# Patient Record
Sex: FEMALE | Race: BLACK OR AFRICAN AMERICAN | Employment: FULL TIME | URBAN - METROPOLITAN AREA
[De-identification: names, ages, dates, MRNs, and addresses within clinical notes are randomized per-mention and may not be internally consistent; named-entity substitution may affect disease eponyms.]

---

## 2017-02-07 ENCOUNTER — OFFICE VISIT (OUTPATIENT)
Dept: FAMILY MEDICINE CLINIC | Age: 57
End: 2017-02-07

## 2017-02-07 VITALS
WEIGHT: 157.5 LBS | BODY MASS INDEX: 26.89 KG/M2 | DIASTOLIC BLOOD PRESSURE: 56 MMHG | RESPIRATION RATE: 16 BRPM | HEART RATE: 68 BPM | SYSTOLIC BLOOD PRESSURE: 108 MMHG | HEIGHT: 64 IN

## 2017-02-07 DIAGNOSIS — E03.9 ACQUIRED HYPOTHYROIDISM: Primary | ICD-10-CM

## 2017-02-07 DIAGNOSIS — I10 ESSENTIAL HYPERTENSION: ICD-10-CM

## 2017-02-07 PROCEDURE — 99213 OFFICE O/P EST LOW 20 MIN: CPT | Performed by: FAMILY MEDICINE

## 2017-02-07 RX ORDER — VARENICLINE TARTRATE 25 MG
KIT ORAL
Qty: 1 EACH | Refills: 0 | Status: SHIPPED | OUTPATIENT
Start: 2017-02-07 | End: 2017-03-29

## 2017-02-07 RX ORDER — VARENICLINE TARTRATE 1 MG/1
TABLET, FILM COATED ORAL
Qty: 60 TABLET | Refills: 3 | Status: SHIPPED | OUTPATIENT
Start: 2017-02-07 | End: 2017-03-29

## 2017-02-07 ASSESSMENT — ENCOUNTER SYMPTOMS
CONSTIPATION: 0
SHORTNESS OF BREATH: 0
SINUS PRESSURE: 0

## 2017-03-10 RX ORDER — LEVOTHYROXINE SODIUM 100 MCG
TABLET ORAL
Qty: 15 TABLET | Refills: 0 | Status: SHIPPED | OUTPATIENT
Start: 2017-03-10 | End: 2017-03-28 | Stop reason: SDUPTHER

## 2017-03-28 LAB
A/G RATIO: 1.3 (ref 1.5–2.5)
ALBUMIN SERPL-MCNC: 4.1 GM/DL (ref 3.5–5)
ALP BLD-CCNC: 74 IU/L (ref 39–118)
ALT SERPL-CCNC: 15 IU/L (ref 10–40)
ANION GAP SERPL CALCULATED.3IONS-SCNC: 10 MMOL/L (ref 4–12)
AST SERPL-CCNC: 22 IU/L (ref 15–41)
BILIRUB SERPL-MCNC: 0.6 MG/DL (ref 0.2–1)
BUN BLDV-MCNC: 13 MG/DL (ref 7–20)
CALCIUM SERPL-MCNC: 8.9 MG/DL (ref 8.8–10.5)
CHLORIDE BLD-SCNC: 101 MEQ/L (ref 101–111)
CHOLESTEROL/HDL RELATIVE RISK: 2.2 (ref 4–4.4)
CHOLESTEROL: 171 MG/DL
CO2: 26 MEQ/L (ref 21–32)
CREAT SERPL-MCNC: 1.09 MG/DL (ref 0.6–1.3)
CREATININE CLEARANCE: >60
DIRECT-LDL / HDL RISK: 1.4
GLUCOSE: 88 MG/DL (ref 70–110)
HDLC SERPL-MCNC: 75 MG/DL
LDL CHOLESTEROL DIRECT: 108 MG/DL
POTASSIUM SERPL-SCNC: 3.6 MEQ/L (ref 3.6–5)
SODIUM BLD-SCNC: 137 MEQ/L (ref 135–145)
TOTAL PROTEIN: 7.2 G/DL (ref 6.2–8)
TRIGL SERPL-MCNC: 94 MG/DL
TSH SERPL DL<=0.05 MIU/L-ACNC: 3.85 MCIU/ML (ref 0.49–4.67)
VLDLC SERPL CALC-MCNC: 18 MG/DL

## 2017-03-28 RX ORDER — LEVOTHYROXINE SODIUM 100 MCG
TABLET ORAL
Qty: 30 TABLET | Refills: 11 | Status: SHIPPED | OUTPATIENT
Start: 2017-03-28 | End: 2018-04-05 | Stop reason: SDUPTHER

## 2017-03-29 ENCOUNTER — OFFICE VISIT (OUTPATIENT)
Dept: NEPHROLOGY | Age: 57
End: 2017-03-29

## 2017-03-29 VITALS
DIASTOLIC BLOOD PRESSURE: 82 MMHG | WEIGHT: 154 LBS | SYSTOLIC BLOOD PRESSURE: 118 MMHG | OXYGEN SATURATION: 97 % | HEART RATE: 61 BPM | BODY MASS INDEX: 26.43 KG/M2

## 2017-03-29 DIAGNOSIS — I10 ESSENTIAL HYPERTENSION: ICD-10-CM

## 2017-03-29 DIAGNOSIS — E87.6 HYPOKALEMIA: ICD-10-CM

## 2017-03-29 DIAGNOSIS — N18.2 CKD (CHRONIC KIDNEY DISEASE), STAGE II: Primary | ICD-10-CM

## 2017-03-29 PROCEDURE — 99213 OFFICE O/P EST LOW 20 MIN: CPT | Performed by: INTERNAL MEDICINE

## 2017-03-29 RX ORDER — POTASSIUM CHLORIDE 20 MEQ/1
20 TABLET, EXTENDED RELEASE ORAL DAILY
Qty: 30 TABLET | Refills: 6 | Status: SHIPPED | OUTPATIENT
Start: 2017-03-29 | End: 2018-04-25

## 2017-04-12 LAB
AMORPHOUS: PRESENT
APPEARANCE: ABNORMAL
BACTERIA: ABNORMAL
BILIRUBIN URINE: NEGATIVE
COLOR: YELLOW
GLUCOSE URINE: NEGATIVE
HYALINE CASTS: >50 /LPF
KETONES, URINE: NEGATIVE
LEUKOCYTES, UA: NEGATIVE
MUCUS: PRESENT
NITRITE, URINE: NEGATIVE
PH, URINE: 6 (ref 5–8)
PROTEIN, URINE: ABNORMAL
RBC URINE: ABNORMAL /HPF
SPECIFIC GRAVITY, URINE: 1.01 (ref 1–1.03)
SQUAMOUS EPITHELIAL: ABNORMAL /HPF
URINALYSIS REFLEX: NO
URINE HGB: NEGATIVE
UROBILINOGEN, URINE: ABNORMAL (ref 0.2–1)
WBC URINE: ABNORMAL /HPF

## 2017-04-17 ENCOUNTER — TELEPHONE (OUTPATIENT)
Dept: NEPHROLOGY | Age: 57
End: 2017-04-17

## 2017-04-17 DIAGNOSIS — E87.6 HYPOKALEMIA: Primary | ICD-10-CM

## 2017-05-17 DIAGNOSIS — I10 ESSENTIAL HYPERTENSION: Primary | ICD-10-CM

## 2017-05-17 DIAGNOSIS — N18.30 CKD (CHRONIC KIDNEY DISEASE), STAGE III (HCC): ICD-10-CM

## 2017-05-17 RX ORDER — ATENOLOL 100 MG/1
TABLET ORAL
Qty: 30 TABLET | Refills: 6 | Status: SHIPPED | OUTPATIENT
Start: 2017-05-17 | End: 2017-12-27 | Stop reason: SDUPTHER

## 2017-06-12 DIAGNOSIS — I10 ESSENTIAL HYPERTENSION: ICD-10-CM

## 2017-06-13 RX ORDER — NIFEDIPINE 90 MG/1
90 TABLET, FILM COATED, EXTENDED RELEASE ORAL DAILY
Qty: 30 TABLET | Refills: 6 | Status: SHIPPED | OUTPATIENT
Start: 2017-06-13 | End: 2017-12-20 | Stop reason: SDUPTHER

## 2017-08-09 ENCOUNTER — TELEPHONE (OUTPATIENT)
Dept: FAMILY MEDICINE CLINIC | Age: 57
End: 2017-08-09

## 2017-08-22 ENCOUNTER — OFFICE VISIT (OUTPATIENT)
Dept: FAMILY MEDICINE CLINIC | Age: 57
End: 2017-08-22

## 2017-08-22 VITALS
HEIGHT: 64 IN | DIASTOLIC BLOOD PRESSURE: 72 MMHG | RESPIRATION RATE: 16 BRPM | SYSTOLIC BLOOD PRESSURE: 154 MMHG | WEIGHT: 154.5 LBS | HEART RATE: 72 BPM | BODY MASS INDEX: 26.38 KG/M2

## 2017-08-22 DIAGNOSIS — I10 ESSENTIAL HYPERTENSION: Primary | ICD-10-CM

## 2017-08-22 PROCEDURE — 99213 OFFICE O/P EST LOW 20 MIN: CPT | Performed by: FAMILY MEDICINE

## 2017-08-22 ASSESSMENT — ENCOUNTER SYMPTOMS
CONSTIPATION: 0
SINUS PRESSURE: 0
SHORTNESS OF BREATH: 0

## 2017-12-20 DIAGNOSIS — I10 ESSENTIAL HYPERTENSION: ICD-10-CM

## 2017-12-20 RX ORDER — NIFEDIPINE 90 MG/1
TABLET, FILM COATED, EXTENDED RELEASE ORAL
Qty: 90 TABLET | Refills: 3 | Status: SHIPPED | OUTPATIENT
Start: 2017-12-20 | End: 2019-01-15 | Stop reason: SDUPTHER

## 2017-12-20 NOTE — TELEPHONE ENCOUNTER
Refill request per Rx via EPIC  Please approve or refuse Rx request:  Requested Prescriptions     Pending Prescriptions Disp Refills    NIFEdipine (ADALAT CC) 90 MG extended release tablet [Pharmacy Med Name: NIFEDIPINE ER 90 MG TABLET] 90 tablet 3     Sig: take 1 tablet by mouth once daily       Next appointment:  03/27/2018

## 2017-12-27 DIAGNOSIS — I10 ESSENTIAL HYPERTENSION: ICD-10-CM

## 2017-12-27 RX ORDER — ATENOLOL 100 MG/1
TABLET ORAL
Qty: 30 TABLET | Refills: 6 | Status: SHIPPED | OUTPATIENT
Start: 2017-12-27 | End: 2018-08-12 | Stop reason: SDUPTHER

## 2018-03-26 ENCOUNTER — TELEPHONE (OUTPATIENT)
Dept: NEPHROLOGY | Age: 58
End: 2018-03-26

## 2018-04-09 DIAGNOSIS — E03.9 ACQUIRED HYPOTHYROIDISM: Primary | ICD-10-CM

## 2018-04-09 RX ORDER — LEVOTHYROXINE SODIUM 100 MCG
TABLET ORAL
Qty: 30 TABLET | Refills: 0 | Status: SHIPPED | OUTPATIENT
Start: 2018-04-09 | End: 2018-05-02 | Stop reason: SDUPTHER

## 2018-04-10 LAB — TSH SERPL DL<=0.05 MIU/L-ACNC: 3.06 MCIU/ML (ref 0.49–4.67)

## 2018-04-17 DIAGNOSIS — N18.2 CHRONIC KIDNEY DISEASE, STAGE II (MILD): Primary | ICD-10-CM

## 2018-04-18 LAB
ANION GAP SERPL CALCULATED.3IONS-SCNC: 11 MMOL/L (ref 4–12)
BUN BLDV-MCNC: 16 MG/DL (ref 7–20)
CALCIUM SERPL-MCNC: 9.9 MG/DL (ref 8.8–10.5)
CHLORIDE BLD-SCNC: 100 MEQ/L (ref 101–111)
CO2: 24 MEQ/L (ref 21–32)
CREAT SERPL-MCNC: 1.07 MG/DL (ref 0.6–1.3)
CREATININE CLEARANCE: >60
GLUCOSE: 122 MG/DL (ref 70–110)
POTASSIUM SERPL-SCNC: 3.4 MEQ/L (ref 3.6–5)
SODIUM BLD-SCNC: 135 MEQ/L (ref 135–145)

## 2018-04-19 ENCOUNTER — TELEPHONE (OUTPATIENT)
Dept: FAMILY MEDICINE CLINIC | Age: 58
End: 2018-04-19

## 2018-04-19 ENCOUNTER — TELEPHONE (OUTPATIENT)
Dept: NEPHROLOGY | Age: 58
End: 2018-04-19

## 2018-04-19 DIAGNOSIS — N18.2 CKD (CHRONIC KIDNEY DISEASE), STAGE II: Primary | ICD-10-CM

## 2018-04-25 RX ORDER — POTASSIUM CHLORIDE 20 MEQ/1
20 TABLET, EXTENDED RELEASE ORAL DAILY
Qty: 90 TABLET | Refills: 3 | Status: SHIPPED | OUTPATIENT
Start: 2018-04-25 | End: 2018-10-19 | Stop reason: SDUPTHER

## 2018-05-02 ENCOUNTER — OFFICE VISIT (OUTPATIENT)
Dept: FAMILY MEDICINE CLINIC | Age: 58
End: 2018-05-02

## 2018-05-02 VITALS
SYSTOLIC BLOOD PRESSURE: 116 MMHG | HEART RATE: 72 BPM | DIASTOLIC BLOOD PRESSURE: 60 MMHG | WEIGHT: 165.38 LBS | HEIGHT: 64 IN | BODY MASS INDEX: 28.24 KG/M2 | RESPIRATION RATE: 16 BRPM

## 2018-05-02 DIAGNOSIS — I10 ESSENTIAL HYPERTENSION: Primary | ICD-10-CM

## 2018-05-02 DIAGNOSIS — E03.9 ACQUIRED HYPOTHYROIDISM: ICD-10-CM

## 2018-05-02 PROCEDURE — 99214 OFFICE O/P EST MOD 30 MIN: CPT | Performed by: FAMILY MEDICINE

## 2018-05-02 RX ORDER — LEVOTHYROXINE SODIUM 100 MCG
TABLET ORAL
Qty: 90 TABLET | Refills: 3 | Status: SHIPPED | OUTPATIENT
Start: 2018-05-02 | End: 2019-05-04 | Stop reason: SDUPTHER

## 2018-05-02 RX ORDER — DICLOFENAC SODIUM 75 MG/1
75 TABLET, DELAYED RELEASE ORAL 2 TIMES DAILY
COMMUNITY
Start: 2018-02-13 | End: 2018-05-30

## 2018-05-02 RX ORDER — DIPHENHYDRAMINE HCL 25 MG
25 TABLET ORAL EVERY 6 HOURS PRN
COMMUNITY
End: 2021-09-10

## 2018-05-02 ASSESSMENT — PATIENT HEALTH QUESTIONNAIRE - PHQ9
2. FEELING DOWN, DEPRESSED OR HOPELESS: 0
SUM OF ALL RESPONSES TO PHQ QUESTIONS 1-9: 0
1. LITTLE INTEREST OR PLEASURE IN DOING THINGS: 0
SUM OF ALL RESPONSES TO PHQ9 QUESTIONS 1 & 2: 0

## 2018-05-02 ASSESSMENT — ENCOUNTER SYMPTOMS
CONSTIPATION: 0
SHORTNESS OF BREATH: 0
SINUS PRESSURE: 0

## 2018-05-22 LAB
ANION GAP SERPL CALCULATED.3IONS-SCNC: 9 MMOL/L (ref 4–12)
BUN BLDV-MCNC: 21 MG/DL (ref 7–20)
CALCIUM SERPL-MCNC: 8.8 MG/DL (ref 8.8–10.5)
CHLORIDE BLD-SCNC: 103 MEQ/L (ref 101–111)
CO2: 25 MEQ/L (ref 21–32)
CREAT SERPL-MCNC: 1.43 MG/DL (ref 0.6–1.3)
CREATININE CLEARANCE: 46
GLUCOSE: 117 MG/DL (ref 70–110)
POTASSIUM SERPL-SCNC: 3.6 MEQ/L (ref 3.6–5)
SODIUM BLD-SCNC: 137 MEQ/L (ref 135–145)

## 2018-05-30 ENCOUNTER — OFFICE VISIT (OUTPATIENT)
Dept: NEPHROLOGY | Age: 58
End: 2018-05-30
Payer: COMMERCIAL

## 2018-05-30 VITALS
OXYGEN SATURATION: 98 % | HEART RATE: 69 BPM | BODY MASS INDEX: 27.77 KG/M2 | SYSTOLIC BLOOD PRESSURE: 119 MMHG | WEIGHT: 161.8 LBS | DIASTOLIC BLOOD PRESSURE: 67 MMHG

## 2018-05-30 DIAGNOSIS — E87.6 HYPOKALEMIA: ICD-10-CM

## 2018-05-30 DIAGNOSIS — I10 ESSENTIAL HYPERTENSION: Primary | ICD-10-CM

## 2018-05-30 PROCEDURE — 99213 OFFICE O/P EST LOW 20 MIN: CPT | Performed by: INTERNAL MEDICINE

## 2018-05-30 RX ORDER — METHYLPREDNISOLONE 4 MG/1
4 TABLET ORAL PRN
Status: ON HOLD | COMMUNITY
Start: 2018-05-15 | End: 2020-06-18

## 2018-08-12 DIAGNOSIS — I10 ESSENTIAL HYPERTENSION: ICD-10-CM

## 2018-08-13 RX ORDER — ATENOLOL 100 MG/1
TABLET ORAL
Qty: 90 TABLET | Refills: 3 | Status: SHIPPED | OUTPATIENT
Start: 2018-08-13 | End: 2019-07-25 | Stop reason: SDUPTHER

## 2018-08-13 NOTE — TELEPHONE ENCOUNTER
Refill request from Rx via [employer]  Please approve or refuse Rx request:  Requested Prescriptions     Pending Prescriptions Disp Refills    atenolol (TENORMIN) 100 MG tablet [Pharmacy Med Name: ATENOLOL 100MG      TAB] 90 tablet 3     Sig: TAKE ONE TABLET BY MOUTH ONCE DAILY       Next appointment:  05.29.2019

## 2018-10-17 LAB
ANION GAP SERPL CALCULATED.3IONS-SCNC: 11 MMOL/L (ref 4–12)
BUN BLDV-MCNC: 13 MG/DL (ref 7–20)
CALCIUM SERPL-MCNC: 9.1 MG/DL (ref 8.8–10.5)
CHLORIDE BLD-SCNC: 99 MEQ/L (ref 101–111)
CO2: 28 MEQ/L (ref 21–32)
CREAT SERPL-MCNC: 0.93 MG/DL (ref 0.6–1.3)
CREATININE CLEARANCE: >60
GLUCOSE: 109 MG/DL (ref 70–110)
POTASSIUM SERPL-SCNC: 3.6 MEQ/L (ref 3.6–5)
SODIUM BLD-SCNC: 138 MEQ/L (ref 135–145)

## 2018-10-18 ENCOUNTER — TELEPHONE (OUTPATIENT)
Dept: NEPHROLOGY | Age: 58
End: 2018-10-18

## 2018-10-18 DIAGNOSIS — N18.2 CKD (CHRONIC KIDNEY DISEASE), STAGE II: ICD-10-CM

## 2018-10-18 NOTE — TELEPHONE ENCOUNTER
----- Message from Kimberly Moncada MD sent at 10/17/2018  9:43 PM EDT -----  Is she taking K-dur?   Pls confirm with patient

## 2018-10-19 RX ORDER — POTASSIUM CHLORIDE 20 MEQ/1
20 TABLET, EXTENDED RELEASE ORAL 2 TIMES DAILY
Qty: 180 TABLET | Refills: 3 | Status: SHIPPED | OUTPATIENT
Start: 2018-10-19 | End: 2019-05-28 | Stop reason: DRUGHIGH

## 2018-10-30 LAB — POTASSIUM SERPL-SCNC: 4.1 MEQ/L (ref 3.6–5)

## 2019-01-15 DIAGNOSIS — I10 ESSENTIAL HYPERTENSION: ICD-10-CM

## 2019-01-15 RX ORDER — NIFEDIPINE 90 MG/1
TABLET, FILM COATED, EXTENDED RELEASE ORAL
Qty: 90 TABLET | Refills: 3 | Status: SHIPPED | OUTPATIENT
Start: 2019-01-15 | End: 2020-02-04

## 2019-05-04 DIAGNOSIS — E03.9 ACQUIRED HYPOTHYROIDISM: ICD-10-CM

## 2019-05-06 RX ORDER — LEVOTHYROXINE SODIUM 100 MCG
TABLET ORAL
Qty: 15 TABLET | Refills: 0 | Status: SHIPPED | OUTPATIENT
Start: 2019-05-06 | End: 2019-05-21 | Stop reason: SDUPTHER

## 2019-05-06 NOTE — TELEPHONE ENCOUNTER
The pharmacy called requesting a refill of the below medication which has been pended for you:     Requested Prescriptions     Pending Prescriptions Disp Refills    SYNTHROID 100 MCG tablet [Pharmacy Med Name: SYNTHROID 100MCG TAB] 30 tablet 11     Sig: TAKE 1 TABLET BY MOUTH ONCE DAILY       Last Appointment Date: 5/2/2018  Next Appointment Date: Visit date not found    No Known Allergies

## 2019-05-08 LAB — TSH SERPL DL<=0.05 MIU/L-ACNC: 2.53 MCIU/ML (ref 0.49–4.67)

## 2019-05-09 ENCOUNTER — TELEPHONE (OUTPATIENT)
Dept: FAMILY MEDICINE CLINIC | Age: 59
End: 2019-05-09

## 2019-05-09 NOTE — TELEPHONE ENCOUNTER
----- Message from Antony Davidson MD sent at 5/8/2019  9:42 PM EDT -----  Let her know the TSH was fine and I sent a RF of the synthroid [place] [facility] [place]. Remind her to see me still this month.

## 2019-05-21 ENCOUNTER — OFFICE VISIT (OUTPATIENT)
Dept: FAMILY MEDICINE CLINIC | Age: 59
End: 2019-05-21

## 2019-05-21 VITALS
DIASTOLIC BLOOD PRESSURE: 70 MMHG | WEIGHT: 170.38 LBS | BODY MASS INDEX: 29.09 KG/M2 | HEIGHT: 64 IN | RESPIRATION RATE: 16 BRPM | HEART RATE: 72 BPM | SYSTOLIC BLOOD PRESSURE: 118 MMHG

## 2019-05-21 DIAGNOSIS — Z12.39 BREAST CANCER SCREENING: Primary | ICD-10-CM

## 2019-05-21 DIAGNOSIS — E03.9 ACQUIRED HYPOTHYROIDISM: ICD-10-CM

## 2019-05-21 DIAGNOSIS — Z11.59 NEED FOR HEPATITIS C SCREENING TEST: ICD-10-CM

## 2019-05-21 PROCEDURE — 99214 OFFICE O/P EST MOD 30 MIN: CPT | Performed by: FAMILY MEDICINE

## 2019-05-21 RX ORDER — LEVOTHYROXINE SODIUM 100 MCG
TABLET ORAL
Qty: 30 TABLET | Refills: 11 | Status: SHIPPED | OUTPATIENT
Start: 2019-05-21 | End: 2019-11-12 | Stop reason: SDUPTHER

## 2019-05-21 ASSESSMENT — PATIENT HEALTH QUESTIONNAIRE - PHQ9
SUM OF ALL RESPONSES TO PHQ QUESTIONS 1-9: 0
SUM OF ALL RESPONSES TO PHQ QUESTIONS 1-9: 0
SUM OF ALL RESPONSES TO PHQ9 QUESTIONS 1 & 2: 0
1. LITTLE INTEREST OR PLEASURE IN DOING THINGS: 0
2. FEELING DOWN, DEPRESSED OR HOPELESS: 0

## 2019-05-21 ASSESSMENT — ENCOUNTER SYMPTOMS
CONSTIPATION: 0
SHORTNESS OF BREATH: 0
SINUS PRESSURE: 0

## 2019-05-21 NOTE — PROGRESS NOTES
Subjective:      Patient ID: Clifton Warren is a 59 y.o. female. HPI  1. There has been a tingle in the left knee the past year. It is not getting better. It is not constant but 1-2 times a week for a few seconds. The buttock and back are fine then too. Sometimes on the right thigh too  Review of Systems   Constitutional: Negative for fatigue. HENT: Negative for sinus pressure. Eyes: Negative for visual disturbance. Respiratory: Negative for shortness of breath. Cardiovascular: Negative for chest pain. Gastrointestinal: Negative for constipation. Genitourinary: Negative. Musculoskeletal: Positive for arthralgias and myalgias. Skin: Negative for rash. Neurological: Negative for headaches. The patient's medications, allergies, past medical problems, surgical, social, and family histories were reviewed and updated as needed. Objective:   Physical Exam   Constitutional: She is oriented to person, place, and time. She appears well-developed and well-nourished. No distress. HENT:   Head: Normocephalic and atraumatic. Right Ear: External ear normal.   Left Ear: External ear normal.   Nose: Nose normal.   Mouth/Throat: Oropharynx is clear and moist. No oropharyngeal exudate. Eyes: Conjunctivae are normal. No scleral icterus. Neck: Neck supple. Carotid bruit is not present. No tracheal deviation present. No thyromegaly present. Cardiovascular: Normal rate, regular rhythm and normal heart sounds. Pulmonary/Chest: Effort normal and breath sounds normal.   Abdominal: Soft. Bowel sounds are normal. She exhibits no mass. Musculoskeletal: She exhibits no edema. There is some crepitance with the left knee but no swelling   Lymphadenopathy:     She has no cervical adenopathy. Neurological: She is alert and oriented to person, place, and time. Skin: Skin is warm and dry. Psychiatric: She has a normal mood and affect.  Her behavior is normal.     Blood pressure 118/70, pulse 72, resp. rate 16, height 5' 4\" (1.626 m), weight 170 lb 6 oz (77.3 kg), not currently breastfeeding. Assessment:       Diagnosis Orders   1. Breast cancer screening  WILBER DIGITAL SCREEN W OR WO CAD BILATERAL   2. Acquired hypothyroidism  SYNTHROID 100 MCG tablet   3. Need for hepatitis C screening test  Hepatitis C Antibody           Plan:      Get the mammogram  Do the non fasting lab soon   See me in a year.         Rk Marvin MD

## 2019-05-21 NOTE — PROGRESS NOTES
Pt scheduled at [facility] for WILBER Digital Screening on Friday May 24th at 130pm but pt is to arrive at 105pm. Pt informed of appt and prep.

## 2019-05-23 ENCOUNTER — TELEPHONE (OUTPATIENT)
Dept: NEPHROLOGY | Age: 59
End: 2019-05-23

## 2019-05-23 NOTE — TELEPHONE ENCOUNTER
----- Message from Sidney Pang MD sent at 5/22/2019  9:11 PM EDT -----  Is she taking K as prescribed?

## 2019-05-28 ENCOUNTER — OFFICE VISIT (OUTPATIENT)
Dept: NEPHROLOGY | Age: 59
End: 2019-05-28
Payer: COMMERCIAL

## 2019-05-28 VITALS
SYSTOLIC BLOOD PRESSURE: 107 MMHG | WEIGHT: 166.6 LBS | DIASTOLIC BLOOD PRESSURE: 62 MMHG | BODY MASS INDEX: 28.6 KG/M2 | OXYGEN SATURATION: 100 % | HEART RATE: 71 BPM

## 2019-05-28 DIAGNOSIS — E87.6 HYPOKALEMIA: Primary | ICD-10-CM

## 2019-05-28 DIAGNOSIS — I10 ESSENTIAL HYPERTENSION: ICD-10-CM

## 2019-05-28 PROCEDURE — 99213 OFFICE O/P EST LOW 20 MIN: CPT | Performed by: INTERNAL MEDICINE

## 2019-05-28 RX ORDER — POTASSIUM CHLORIDE 20 MEQ/1
20 TABLET, EXTENDED RELEASE ORAL 3 TIMES DAILY
Qty: 180 TABLET | Refills: 3 | Status: SHIPPED
Start: 2019-05-28 | End: 2019-10-22 | Stop reason: SDUPTHER

## 2019-05-28 NOTE — PROGRESS NOTES
[facility]    [facility], [address]   [place] [facility]  [phone]  Progress Note  5/28/2019 9:33 AM      Pt Name:    Tonie Haro  YOB: 1960  Primary Care Physician:  Lambert Mullins MD     Chief Complaint:   Chief Complaint   Patient presents with    Follow-up     essential hypertension        Background Information/Interval History:   The patient is a [place] old [address] female who was following Dr. Jordana Turner previously. She has hx hypertension for 15+ years, hx CKD II,  vitamin D deficiency and hypothyroidism.      She is here for annual follow-up. She is taking K-dur 20 meq twice a day. She has no complaints. No diarrhea. She is on atenolol and nifedipine. She was taking diclofenac for tendonitis which she is no longer taking it. She says BP at home is usually in 110 range. Past History:  Past Medical History:   Diagnosis Date    Chronic kidney disease     Hypertension 2001    Hyperthyroidism 1989    Vitamin D deficiency      Past Surgical History:   Procedure Laterality Date    FOOT SURGERY  11/18/2016    Dr Bee Estimable:  /62 (Site: Right Upper Arm, Position: Sitting, Cuff Size: Large Adult)   Pulse 71   Wt 166 lb 9.6 oz (75.6 kg)   SpO2 100%   BMI 28.60 kg/m²   Wt Readings from Last 3 Encounters:   05/28/19 166 lb 9.6 oz (75.6 kg)   05/21/19 170 lb 6 oz (77.3 kg)   05/30/18 161 lb 12.8 oz (73.4 kg)     Body mass index is 28.6 kg/m².      General Appearance: alert and cooperative with exam, appears comfortable, no distress  Oral: moist oral mucus membranes  Neck: No jugular venous distention  Lungs: Air entry B/L, no crackles or rales, no use of accessory muscles  Heart: S1, S2 heard  GI: soft, non-tender, no guarding  Extremities: No sig LE edema     Medications:  Current Outpatient Medications   Medication Sig Dispense Refill    SYNTHROID 100 MCG tablet TAKE 1 TABLET BY MOUTH ONCE DAILY 30 tablet 11    NIFEdipine (ADALAT CC) 90 MG extended release tablet TAKE ONE TABLET BY MOUTH ONCE DAILY 90 tablet 3    potassium chloride (KLOR-CON M) 20 MEQ extended release tablet Take 1 tablet by mouth 2 times daily 180 tablet 3    atenolol (TENORMIN) 100 MG tablet TAKE ONE TABLET BY MOUTH ONCE DAILY 90 tablet 3    methylPREDNISolone (MEDROL DOSEPACK) 4 MG tablet Take 4 mg by mouth as needed       diphenhydrAMINE (BENADRYL) 25 MG tablet Take 25 mg by mouth every 6 hours as needed for Itching      GARLIC PO Take 1 tablet by mouth daily      vitamin D (CHOLECALCIFEROL) 1000 UNIT TABS tablet Take 1,000 Units by mouth daily. No current facility-administered medications for this visit. Laboratory & Diagnostics:  Radiology/US kidneys:April 2014: L 10.2 cm, R 8.4 cm, No LINDA  Aug 2015: BMP: K 3.5, Creat 1.1, Hb 14.3, UA: no blood, 100 mg/dL protein, UPCR 100 mg/g  Feb 2016: , K 3.7, Creat 0.81  March 2017: Creat 1.09, K 3.6     May 2018: K 3.6, Creat 1.4  May 2019: K 3.3, Creat 1.07     Impression/Plan:   1. Hypokalemia: check renin and aldosterone levels. But appears unlikely this is primary hyperaldosteronism. BP seems very well controlled with two medications. I advised to stop nifedipine and start either lisinopril or aldactone but she declined. She says,  \"I dont want to experiment with a new pill. \" Take K-dur 20 meq po TID for now. Check K level in 1 week. 2. Hypertension: Stable/controlled. Continue with atenolol and nifedipine. 3. Right atrophic kidney     Orders Placed This Encounter   Procedures    Potassium    Basic Metabolic Panel    Protein / creatinine ratio, urine     Return in about 4 months (around 9/28/2019).       Sintia Hu MD  [facility]

## 2019-07-25 DIAGNOSIS — I10 ESSENTIAL HYPERTENSION: ICD-10-CM

## 2019-07-26 RX ORDER — ATENOLOL 100 MG/1
TABLET ORAL
Qty: 90 TABLET | Refills: 3 | Status: SHIPPED | OUTPATIENT
Start: 2019-07-26 | End: 2019-12-11

## 2019-10-22 RX ORDER — POTASSIUM CHLORIDE 20 MEQ/1
20 TABLET, EXTENDED RELEASE ORAL 3 TIMES DAILY
Qty: 90 TABLET | Refills: 2 | Status: SHIPPED | OUTPATIENT
Start: 2019-10-22 | End: 2019-12-11 | Stop reason: SDUPTHER

## 2019-11-12 ENCOUNTER — TELEPHONE (OUTPATIENT)
Dept: FAMILY MEDICINE CLINIC | Age: 59
End: 2019-11-12

## 2019-11-12 DIAGNOSIS — E03.9 ACQUIRED HYPOTHYROIDISM: ICD-10-CM

## 2019-11-12 RX ORDER — LEVOTHYROXINE SODIUM 0.1 MG/1
TABLET ORAL
Qty: 30 TABLET | Refills: 11 | Status: SHIPPED | OUTPATIENT
Start: 2019-11-12 | End: 2020-11-30

## 2019-12-10 LAB
CREATININE, RANDOM URINE: 110.3 MG/DL
PROTEIN, URINE, RANDOM: 32.7 MG/DL
PROTEIN/CREAT RATIO: 0.3 G/1.73M2

## 2019-12-11 ENCOUNTER — OFFICE VISIT (OUTPATIENT)
Dept: NEPHROLOGY | Age: 59
End: 2019-12-11
Payer: COMMERCIAL

## 2019-12-11 VITALS
SYSTOLIC BLOOD PRESSURE: 96 MMHG | HEART RATE: 63 BPM | BODY MASS INDEX: 27.32 KG/M2 | WEIGHT: 164 LBS | DIASTOLIC BLOOD PRESSURE: 58 MMHG | OXYGEN SATURATION: 98 % | HEIGHT: 65 IN

## 2019-12-11 DIAGNOSIS — E87.6 HYPOKALEMIA: Primary | ICD-10-CM

## 2019-12-11 DIAGNOSIS — I10 ESSENTIAL HYPERTENSION: ICD-10-CM

## 2019-12-11 PROCEDURE — 99213 OFFICE O/P EST LOW 20 MIN: CPT | Performed by: INTERNAL MEDICINE

## 2019-12-11 RX ORDER — ATENOLOL 50 MG/1
50 TABLET ORAL DAILY
Qty: 30 TABLET | Refills: 3 | Status: SHIPPED | OUTPATIENT
Start: 2019-12-11 | End: 2020-09-28

## 2019-12-11 RX ORDER — POTASSIUM CHLORIDE 20 MEQ/1
20 TABLET, EXTENDED RELEASE ORAL 3 TIMES DAILY
Qty: 90 TABLET | Refills: 3 | Status: SHIPPED | OUTPATIENT
Start: 2019-12-11 | End: 2020-05-11

## 2019-12-11 RX ORDER — BIOTIN 1 MG
TABLET ORAL DAILY
COMMUNITY
End: 2021-10-20

## 2020-02-04 RX ORDER — NIFEDIPINE 90 MG/1
TABLET, FILM COATED, EXTENDED RELEASE ORAL
Qty: 90 TABLET | Refills: 0 | Status: SHIPPED | OUTPATIENT
Start: 2020-02-04 | End: 2020-05-11

## 2020-05-11 RX ORDER — NIFEDIPINE 90 MG/1
TABLET, FILM COATED, EXTENDED RELEASE ORAL
Qty: 90 TABLET | Refills: 0 | Status: SHIPPED | OUTPATIENT
Start: 2020-05-11 | End: 2020-08-20

## 2020-05-11 RX ORDER — POTASSIUM CHLORIDE 20 MEQ/1
TABLET, EXTENDED RELEASE ORAL
Qty: 270 TABLET | Refills: 0 | Status: SHIPPED | OUTPATIENT
Start: 2020-05-11 | End: 2020-11-02

## 2020-05-12 ENCOUNTER — TELEPHONE (OUTPATIENT)
Dept: FAMILY MEDICINE CLINIC | Age: 60
End: 2020-05-12

## 2020-06-03 ENCOUNTER — HOSPITAL ENCOUNTER (OUTPATIENT)
Dept: GENERAL RADIOLOGY | Age: 60
Discharge: HOME | End: 2020-06-03
Payer: COMMERCIAL

## 2020-06-03 ENCOUNTER — HOSPITAL ENCOUNTER (OUTPATIENT)
Age: 60
Discharge: HOME | End: 2020-06-03
Payer: COMMERCIAL

## 2020-06-03 LAB
BASOPHILS # BLD: 0.4 %
BASOPHILS ABSOLUTE: 0 THOU/MM3 (ref 0–0.1)
EKG ATRIAL RATE: 67 BPM
EKG P AXIS: 64 DEGREES
EKG P-R INTERVAL: 160 MS
EKG Q-T INTERVAL: 434 MS
EKG QRS DURATION: 86 MS
EKG QTC CALCULATION (BAZETT): 458 MS
EKG R AXIS: 24 DEGREES
EKG T AXIS: 42 DEGREES
EKG VENTRICULAR RATE: 67 BPM
EOSINOPHIL # BLD: 1 %
EOSINOPHILS ABSOLUTE: 0.1 THOU/MM3 (ref 0–0.4)
ERYTHROCYTE [DISTWIDTH] IN BLOOD BY AUTOMATED COUNT: 15.2 % (ref 11.5–14.5)
ERYTHROCYTE [DISTWIDTH] IN BLOOD BY AUTOMATED COUNT: 50.3 FL (ref 35–45)
HCT VFR BLD CALC: 42.5 % (ref 37–47)
HEMOGLOBIN: 13.1 GM/DL (ref 12–16)
IMMATURE GRANS (ABS): 0.01 THOU/MM3 (ref 0–0.07)
IMMATURE GRANULOCYTES: 0.1 %
LYMPHOCYTES # BLD: 25.7 %
LYMPHOCYTES ABSOLUTE: 2 THOU/MM3 (ref 1–4.8)
MCH RBC QN AUTO: 27.9 PG (ref 26–33)
MCHC RBC AUTO-ENTMCNC: 30.8 GM/DL (ref 32.2–35.5)
MCV RBC AUTO: 90.4 FL (ref 81–99)
MONOCYTES # BLD: 9.8 %
MONOCYTES ABSOLUTE: 0.7 THOU/MM3 (ref 0.4–1.3)
NUCLEATED RED BLOOD CELLS: 0 /100 WBC
PLATELET # BLD: 232 THOU/MM3 (ref 130–400)
PMV BLD AUTO: 12.2 FL (ref 9.4–12.4)
POTASSIUM SERPL-SCNC: 4.2 MEQ/L (ref 3.5–5.2)
RBC # BLD: 4.7 MILL/MM3 (ref 4.2–5.4)
SEG NEUTROPHILS: 63 %
SEGMENTED NEUTROPHILS ABSOLUTE COUNT: 4.8 THOU/MM3 (ref 1.8–7.7)
WBC # BLD: 7.6 THOU/MM3 (ref 4.8–10.8)

## 2020-06-03 PROCEDURE — 85025 COMPLETE CBC W/AUTO DIFF WBC: CPT

## 2020-06-03 PROCEDURE — 93005 ELECTROCARDIOGRAM TRACING: CPT | Performed by: OBSTETRICS & GYNECOLOGY

## 2020-06-03 PROCEDURE — 71046 X-RAY EXAM CHEST 2 VIEWS: CPT

## 2020-06-03 PROCEDURE — 36415 COLL VENOUS BLD VENIPUNCTURE: CPT

## 2020-06-03 PROCEDURE — 84132 ASSAY OF SERUM POTASSIUM: CPT

## 2020-06-10 ENCOUNTER — OFFICE VISIT (OUTPATIENT)
Dept: FAMILY MEDICINE CLINIC | Age: 60
End: 2020-06-10

## 2020-06-10 VITALS
BODY MASS INDEX: 26.55 KG/M2 | HEART RATE: 68 BPM | SYSTOLIC BLOOD PRESSURE: 128 MMHG | WEIGHT: 159.38 LBS | HEIGHT: 65 IN | DIASTOLIC BLOOD PRESSURE: 72 MMHG | RESPIRATION RATE: 12 BRPM | TEMPERATURE: 97.2 F

## 2020-06-10 PROCEDURE — 99214 OFFICE O/P EST MOD 30 MIN: CPT | Performed by: FAMILY MEDICINE

## 2020-06-10 ASSESSMENT — ENCOUNTER SYMPTOMS
CONSTIPATION: 0
SINUS PRESSURE: 0
SHORTNESS OF BREATH: 0

## 2020-06-12 NOTE — PROGRESS NOTES
PAT call attempted, patient unavailable, left message to please call us back at your earliest convenience; [phone]

## 2020-06-13 ENCOUNTER — HOSPITAL ENCOUNTER (OUTPATIENT)
Age: 60
Discharge: HOME | End: 2020-06-13
Payer: COMMERCIAL

## 2020-06-13 PROCEDURE — U0002 COVID-19 LAB TEST NON-CDC: HCPCS

## 2020-06-14 LAB
PERFORMING LAB: NORMAL
REPORT: NORMAL
SARS-COV-2: NOT DETECTED

## 2020-06-18 ENCOUNTER — ANESTHESIA EVENT (OUTPATIENT)
Dept: OPERATING ROOM | Age: 60
End: 2020-06-18
Payer: COMMERCIAL

## 2020-06-18 ENCOUNTER — ANESTHESIA (OUTPATIENT)
Dept: OPERATING ROOM | Age: 60
End: 2020-06-18
Payer: COMMERCIAL

## 2020-06-18 ENCOUNTER — HOSPITAL ENCOUNTER (OUTPATIENT)
Age: 60
Setting detail: OUTPATIENT SURGERY
Discharge: HOME | End: 2020-06-18
Attending: OBSTETRICS & GYNECOLOGY | Admitting: OBSTETRICS & GYNECOLOGY
Payer: COMMERCIAL

## 2020-06-18 VITALS
DIASTOLIC BLOOD PRESSURE: 80 MMHG | HEART RATE: 70 BPM | SYSTOLIC BLOOD PRESSURE: 144 MMHG | OXYGEN SATURATION: 95 % | TEMPERATURE: 98 F | HEIGHT: 64 IN | WEIGHT: 155 LBS | BODY MASS INDEX: 26.46 KG/M2 | RESPIRATION RATE: 15 BRPM

## 2020-06-18 VITALS
OXYGEN SATURATION: 100 % | SYSTOLIC BLOOD PRESSURE: 126 MMHG | DIASTOLIC BLOOD PRESSURE: 62 MMHG | RESPIRATION RATE: 11 BRPM

## 2020-06-18 PROBLEM — Z98.890 S/P D&C (STATUS POST DILATION AND CURETTAGE): Status: ACTIVE | Noted: 2020-06-18

## 2020-06-18 PROCEDURE — 2500000003 HC RX 250 WO HCPCS: Performed by: NURSE ANESTHETIST, CERTIFIED REGISTERED

## 2020-06-18 PROCEDURE — 7100000011 HC PHASE II RECOVERY - ADDTL 15 MIN: Performed by: OBSTETRICS & GYNECOLOGY

## 2020-06-18 PROCEDURE — 88305 TISSUE EXAM BY PATHOLOGIST: CPT

## 2020-06-18 PROCEDURE — 3700000000 HC ANESTHESIA ATTENDED CARE: Performed by: OBSTETRICS & GYNECOLOGY

## 2020-06-18 PROCEDURE — 3600000013 HC SURGERY LEVEL 3 ADDTL 15MIN: Performed by: OBSTETRICS & GYNECOLOGY

## 2020-06-18 PROCEDURE — 7100000000 HC PACU RECOVERY - FIRST 15 MIN: Performed by: OBSTETRICS & GYNECOLOGY

## 2020-06-18 PROCEDURE — 2580000003 HC RX 258: Performed by: OBSTETRICS & GYNECOLOGY

## 2020-06-18 PROCEDURE — 6360000002 HC RX W HCPCS: Performed by: NURSE ANESTHETIST, CERTIFIED REGISTERED

## 2020-06-18 PROCEDURE — 7100000001 HC PACU RECOVERY - ADDTL 15 MIN: Performed by: OBSTETRICS & GYNECOLOGY

## 2020-06-18 PROCEDURE — 2709999900 HC NON-CHARGEABLE SUPPLY: Performed by: OBSTETRICS & GYNECOLOGY

## 2020-06-18 PROCEDURE — 3700000001 HC ADD 15 MINUTES (ANESTHESIA): Performed by: OBSTETRICS & GYNECOLOGY

## 2020-06-18 PROCEDURE — 7100000010 HC PHASE II RECOVERY - FIRST 15 MIN: Performed by: OBSTETRICS & GYNECOLOGY

## 2020-06-18 PROCEDURE — 3600000003 HC SURGERY LEVEL 3 BASE: Performed by: OBSTETRICS & GYNECOLOGY

## 2020-06-18 RX ORDER — MEPERIDINE HYDROCHLORIDE 25 MG/ML
12.5 INJECTION INTRAMUSCULAR; INTRAVENOUS; SUBCUTANEOUS EVERY 5 MIN PRN
Status: DISCONTINUED | OUTPATIENT
Start: 2020-06-18 | End: 2020-06-18 | Stop reason: HOSPADM

## 2020-06-18 RX ORDER — HYDRALAZINE HYDROCHLORIDE 20 MG/ML
5 INJECTION INTRAMUSCULAR; INTRAVENOUS EVERY 10 MIN PRN
Status: DISCONTINUED | OUTPATIENT
Start: 2020-06-18 | End: 2020-06-18 | Stop reason: HOSPADM

## 2020-06-18 RX ORDER — SODIUM CHLORIDE 9 MG/ML
INJECTION, SOLUTION INTRAVENOUS CONTINUOUS
Status: DISCONTINUED | OUTPATIENT
Start: 2020-06-18 | End: 2020-06-18 | Stop reason: HOSPADM

## 2020-06-18 RX ORDER — OXYCODONE HYDROCHLORIDE AND ACETAMINOPHEN 5; 325 MG/1; MG/1
1 TABLET ORAL EVERY 4 HOURS PRN
Status: DISCONTINUED | OUTPATIENT
Start: 2020-06-18 | End: 2020-06-18 | Stop reason: HOSPADM

## 2020-06-18 RX ORDER — IBUPROFEN 800 MG/1
400 TABLET ORAL EVERY 6 HOURS PRN
Status: DISCONTINUED | OUTPATIENT
Start: 2020-06-18 | End: 2020-06-18 | Stop reason: HOSPADM

## 2020-06-18 RX ORDER — OXYCODONE HYDROCHLORIDE AND ACETAMINOPHEN 5; 325 MG/1; MG/1
2 TABLET ORAL EVERY 4 HOURS PRN
Status: DISCONTINUED | OUTPATIENT
Start: 2020-06-18 | End: 2020-06-18 | Stop reason: HOSPADM

## 2020-06-18 RX ORDER — SODIUM CHLORIDE 0.9 % (FLUSH) 0.9 %
10 SYRINGE (ML) INJECTION EVERY 12 HOURS SCHEDULED
Status: DISCONTINUED | OUTPATIENT
Start: 2020-06-18 | End: 2020-06-18 | Stop reason: HOSPADM

## 2020-06-18 RX ORDER — GLYCOPYRROLATE 1 MG/5 ML
SYRINGE (ML) INTRAVENOUS PRN
Status: DISCONTINUED | OUTPATIENT
Start: 2020-06-18 | End: 2020-06-18 | Stop reason: SDUPTHER

## 2020-06-18 RX ORDER — SODIUM CHLORIDE 0.9 % (FLUSH) 0.9 %
10 SYRINGE (ML) INJECTION PRN
Status: DISCONTINUED | OUTPATIENT
Start: 2020-06-18 | End: 2020-06-18 | Stop reason: HOSPADM

## 2020-06-18 RX ORDER — FENTANYL CITRATE 50 UG/ML
INJECTION, SOLUTION INTRAMUSCULAR; INTRAVENOUS PRN
Status: DISCONTINUED | OUTPATIENT
Start: 2020-06-18 | End: 2020-06-18 | Stop reason: SDUPTHER

## 2020-06-18 RX ORDER — HYDROCODONE BITARTRATE AND ACETAMINOPHEN 5; 325 MG/1; MG/1
1 TABLET ORAL EVERY 6 HOURS PRN
Qty: 10 TABLET | Refills: 0 | Status: SHIPPED | OUTPATIENT
Start: 2020-06-18 | End: 2020-06-21

## 2020-06-18 RX ORDER — FENTANYL CITRATE 50 UG/ML
50 INJECTION, SOLUTION INTRAMUSCULAR; INTRAVENOUS EVERY 5 MIN PRN
Status: DISCONTINUED | OUTPATIENT
Start: 2020-06-18 | End: 2020-06-18 | Stop reason: HOSPADM

## 2020-06-18 RX ORDER — ONDANSETRON 2 MG/ML
4 INJECTION INTRAMUSCULAR; INTRAVENOUS
Status: DISCONTINUED | OUTPATIENT
Start: 2020-06-18 | End: 2020-06-18 | Stop reason: HOSPADM

## 2020-06-18 RX ORDER — PROPOFOL 10 MG/ML
INJECTION, EMULSION INTRAVENOUS PRN
Status: DISCONTINUED | OUTPATIENT
Start: 2020-06-18 | End: 2020-06-18 | Stop reason: SDUPTHER

## 2020-06-18 RX ORDER — MORPHINE SULFATE 2 MG/ML
2 INJECTION, SOLUTION INTRAMUSCULAR; INTRAVENOUS EVERY 5 MIN PRN
Status: DISCONTINUED | OUTPATIENT
Start: 2020-06-18 | End: 2020-06-18 | Stop reason: HOSPADM

## 2020-06-18 RX ORDER — DIPHENHYDRAMINE HYDROCHLORIDE 50 MG/ML
12.5 INJECTION INTRAMUSCULAR; INTRAVENOUS
Status: DISCONTINUED | OUTPATIENT
Start: 2020-06-18 | End: 2020-06-18 | Stop reason: HOSPADM

## 2020-06-18 RX ORDER — LABETALOL 20 MG/4 ML (5 MG/ML) INTRAVENOUS SYRINGE
5 EVERY 5 MIN PRN
Status: DISCONTINUED | OUTPATIENT
Start: 2020-06-18 | End: 2020-06-18 | Stop reason: HOSPADM

## 2020-06-18 RX ORDER — ONDANSETRON 2 MG/ML
INJECTION INTRAMUSCULAR; INTRAVENOUS PRN
Status: DISCONTINUED | OUTPATIENT
Start: 2020-06-18 | End: 2020-06-18 | Stop reason: SDUPTHER

## 2020-06-18 RX ORDER — DEXAMETHASONE SODIUM PHOSPHATE 4 MG/ML
INJECTION, SOLUTION INTRA-ARTICULAR; INTRALESIONAL; INTRAMUSCULAR; INTRAVENOUS; SOFT TISSUE PRN
Status: DISCONTINUED | OUTPATIENT
Start: 2020-06-18 | End: 2020-06-18 | Stop reason: SDUPTHER

## 2020-06-18 RX ADMIN — Medication 0.2 MG: at 09:42

## 2020-06-18 RX ADMIN — PROPOFOL 50 MG: 10 INJECTION, EMULSION INTRAVENOUS at 09:52

## 2020-06-18 RX ADMIN — PROPOFOL 150 MG: 10 INJECTION, EMULSION INTRAVENOUS at 09:46

## 2020-06-18 RX ADMIN — SODIUM CHLORIDE: 9 INJECTION, SOLUTION INTRAVENOUS at 09:40

## 2020-06-18 RX ADMIN — FENTANYL CITRATE 50 MCG: 50 INJECTION, SOLUTION INTRAMUSCULAR; INTRAVENOUS at 09:43

## 2020-06-18 RX ADMIN — DEXAMETHASONE SODIUM PHOSPHATE 12 MG: 4 INJECTION, SOLUTION INTRAMUSCULAR; INTRAVENOUS at 09:57

## 2020-06-18 RX ADMIN — ONDANSETRON HYDROCHLORIDE 4 MG: 4 INJECTION, SOLUTION INTRAMUSCULAR; INTRAVENOUS at 09:58

## 2020-06-18 RX ADMIN — FENTANYL CITRATE 50 MCG: 50 INJECTION, SOLUTION INTRAMUSCULAR; INTRAVENOUS at 09:49

## 2020-06-18 ASSESSMENT — PULMONARY FUNCTION TESTS
PIF_VALUE: 11
PIF_VALUE: 0
PIF_VALUE: 5
PIF_VALUE: 24
PIF_VALUE: 11
PIF_VALUE: 4
PIF_VALUE: 0
PIF_VALUE: 10
PIF_VALUE: 12
PIF_VALUE: 9
PIF_VALUE: 3
PIF_VALUE: 1
PIF_VALUE: 9
PIF_VALUE: 8
PIF_VALUE: 0
PIF_VALUE: 3
PIF_VALUE: 24
PIF_VALUE: 12
PIF_VALUE: 9
PIF_VALUE: 0
PIF_VALUE: 0
PIF_VALUE: 1
PIF_VALUE: 0
PIF_VALUE: 13
PIF_VALUE: 17
PIF_VALUE: 0

## 2020-06-18 ASSESSMENT — PAIN - FUNCTIONAL ASSESSMENT: PAIN_FUNCTIONAL_ASSESSMENT: 0-10

## 2020-06-18 NOTE — ANESTHESIA PRE PROCEDURE
Department of Anesthesiology  Preprocedure Note       Name:  Jenn Gentile   Age:  62 y.o.  :  1960                                          MRN:  [id number]         Date:  2020      Surgeon: Beverly Perkins):  Chris Rajput MD    Procedure: Procedure(s):  DILATATION AND CURETTAGE HYSTEROSCOPY    Medications prior to admission:   Prior to Admission medications    Medication Sig Start Date End Date Taking? Authorizing Provider   NIFEdipine (ADALAT CC) 90 MG extended release tablet Take 1 tablet by mouth once daily 20  Yes Renny López MD   potassium chloride (KLOR-CON M) 20 MEQ extended release tablet TAKE 1 TABLET BY MOUTH THREE TIMES DAILY 20  Yes Renny López MD   Biotin 1000 MCG TABS Take by mouth daily   Yes Historical Provider, MD   atenolol (TENORMIN) 50 MG tablet Take 1 tablet by mouth daily 19  Yes Renny López MD   levothyroxine (SYNTHROID) 100 MCG tablet TAKE 1 TABLET BY MOUTH ONCE DAILY 19  Yes Philip Ramirez MD   diphenhydrAMINE (BENADRYL) 25 MG tablet Take 25 mg by mouth every 6 hours as needed for Itching   Yes Historical Provider, MD   GARLIC PO Take 1 tablet by mouth daily   Yes Historical Provider, MD   vitamin D (CHOLECALCIFEROL) 1000 UNIT TABS tablet Take 1,000 Units by mouth daily.      Yes Historical Provider, MD       Current medications:    Current Facility-Administered Medications   Medication Dose Route Frequency Provider Last Rate Last Dose    0.9 % sodium chloride infusion   Intravenous Continuous Chris Rajput MD        sodium chloride flush 0.9 % injection 10 mL  10 mL Intravenous 2 times per day Chris Rajput MD        sodium chloride flush 0.9 % injection 10 mL  10 mL Intravenous PRN Chris Rajput MD           Allergies:  No Known Allergies    Problem List:    Patient Active Problem List   Diagnosis Code    Stage 2 chronic kidney disease N18.2    Vitamin D deficiency E55.9    CKD (chronic kidney disease), stage III ([facility] [address] N18.3

## 2020-06-18 NOTE — ANESTHESIA POSTPROCEDURE EVALUATION
Department of Anesthesiology  Postprocedure Note    Patient: Linda Snowden  MRN: [id number]  YOB: 1960  Date of evaluation: 6/18/2020  Time:  12:10 PM     Procedure Summary     Date:  06/18/20 Room / Location:  [address] [place] [address]    Anesthesia Start:  0934 Anesthesia Stop:  1010    Procedure:  DILATATION AND CURETTAGE HYSTEROSCOPY (N/A ) Diagnosis:  (POST MENOPAUSAL BLEEDING)    Surgeon:  Ace Vasquez MD Responsible Provider:  Louann Bhatti MD    Anesthesia Type:  general ASA Status:  2          Anesthesia Type: general    Henry Phase I: Henry Score: 10    Henry Phase II: Henry Score: 10    Last vitals: Reviewed and per EMR flowsheets. Anesthesia Post Evaluation   [facility]  POST-ANESTHESIA NOTE       Name:  Linda Snowden                                         Age:  62 y.o.   MRN:  [id number]      Last Vitals:  BP (!) 144/80   Pulse 70   Temp 98 °F (36.7 °C) (Oral)   Resp 15   Ht 5' 4\" (1.626 m)   Wt 155 lb (70.3 kg)   SpO2 95%   BMI 26.61 kg/m²   Patient Vitals for the past 4 hrs:   BP Temp Temp src Pulse Resp SpO2 Height Weight   06/18/20 1040 (!) 144/80 -- -- 70 15 95 % -- --   06/18/20 1035 137/79 -- -- 71 16 97 % -- --   06/18/20 1030 135/76 -- -- 69 10 97 % -- --   06/18/20 1025 136/73 -- -- 72 13 98 % -- --   06/18/20 1020 139/76 -- -- 71 14 98 % -- --   06/18/20 1015 125/73 -- -- 70 14 97 % -- --   06/18/20 1010 127/69 -- -- 70 18 98 % -- --   06/18/20 1007 127/69 98 °F (36.7 °C) Oral 70 20 93 % -- --   06/18/20 0839 (!) 152/74 97.6 °F (36.4 °C) Infrared 61 16 97 % 5' 4\" (1.626 m) 155 lb (70.3 kg)       Level of Consciousness:  Awake    Respiratory:  Stable    Oxygen Saturation:  Stable    Cardiovascular:  Stable    Hydration:  Adequate    PONV:  Stable    Post-op Pain:  Adequate analgesia    Post-op Assessment:  No apparent anesthetic complications    Additional Follow-Up / Treatment / Comment:  None    Prudence MD Magy  Melissa

## 2020-06-18 NOTE — H&P
nondistended, no masses or organomegaly  Pelvic - deferred  Neurological - alert, oriented, normal speech, no focal findings or movement disorder noted  Musculoskeletal - no joint tenderness, deformity or swelling  Extremities - peripheral pulses normal, no pedal edema, no clubbing or cyanosis  Skin - normal coloration and turgor, no rashes, no suspicious skin lesions noted      Review of Labs and Diagnostic Testing:      CBC:   Lab Results   Component Value Date    WBC 7.6 06/03/2020    RBC 4.70 06/03/2020    HGB 13.1 06/03/2020    HCT 42.5 06/03/2020    MCV 90.4 06/03/2020    RDW 14.1 08/26/2015     06/03/2020         Radiology:        Assessment:    Patient Active Problem List   Diagnosis    Stage 2 chronic kidney disease    Vitamin D deficiency    CKD (chronic kidney disease), stage III ([facility] [address]    Acquired hypothyroidism    Essential hypertension         Plan:  1. D&C/H      Electronically signed by Chris Rajput MD on 6/18/2020 at 9:23 AM

## 2020-08-20 RX ORDER — NIFEDIPINE 90 MG/1
TABLET, FILM COATED, EXTENDED RELEASE ORAL
Qty: 90 TABLET | Refills: 1 | Status: SHIPPED | OUTPATIENT
Start: 2020-08-20 | End: 2021-03-05 | Stop reason: SDUPTHER

## 2020-09-08 LAB
ANION GAP SERPL CALCULATED.3IONS-SCNC: 5 MMOL/L (ref 4–12)
BUN BLDV-MCNC: 18 MG/DL (ref 7–20)
CALCIUM SERPL-MCNC: 8.9 MG/DL (ref 8.8–10.5)
CHLORIDE BLD-SCNC: 106 MEQ/L (ref 101–111)
CO2: 26 MEQ/L (ref 21–32)
CREAT SERPL-MCNC: 0.93 MG/DL (ref 0.6–1.3)
CREATININE CLEARANCE: >60
GLUCOSE: 95 MG/DL (ref 70–110)
POTASSIUM SERPL-SCNC: 3.8 MEQ/L (ref 3.6–5)
SODIUM BLD-SCNC: 137 MEQ/L (ref 135–145)

## 2020-09-09 ENCOUNTER — OFFICE VISIT (OUTPATIENT)
Dept: NEPHROLOGY | Age: 60
End: 2020-09-09
Payer: COMMERCIAL

## 2020-09-09 VITALS
TEMPERATURE: 98.2 F | OXYGEN SATURATION: 96 % | HEART RATE: 74 BPM | DIASTOLIC BLOOD PRESSURE: 67 MMHG | WEIGHT: 156.4 LBS | SYSTOLIC BLOOD PRESSURE: 111 MMHG | BODY MASS INDEX: 26.85 KG/M2

## 2020-09-09 PROCEDURE — 99213 OFFICE O/P EST LOW 20 MIN: CPT | Performed by: INTERNAL MEDICINE

## 2020-09-09 NOTE — PROGRESS NOTES
[facility]    [facility], [address]   [address] [facility] [place] [facility]  [phone]  Progress Note  9/9/2020 10:07 AM        Pt Name:    Chelsie Roberts  YOB: 1960  Primary Care Physician:  Nina Felix MD     Chief Complaint:   Chief Complaint   Patient presents with    Follow-up     Hypokalemia, HTN        Background Information/Interval History:   The patient is a 54 year old [address] female who was following Dr. Abhijeet Freitas previously. She has hx hypertension for 15+ years, hx CKD II,  vitamin D deficiency and hypothyroidism. She is here for follow-up. Has not been checking BP at home but in office today is stable at 111/67. She has no complaints. No chest pain or shortness of breath. She is taking K-dur 20 meq TID. She is on atenolol and nifedipine. Past History:  Past Medical History:   Diagnosis Date    Chronic kidney disease     Hypertension 2001    Hyperthyroidism 1989    Vitamin D deficiency      Past Surgical History:   Procedure Laterality Date    DILATION AND CURETTAGE OF UTERUS N/A 6/18/2020    DILATATION AND CURETTAGE HYSTEROSCOPY performed by Karli Dc MD at [address] [facility]  11/18/2016    Dr Fabi Barfield:  /67 (Site: Right Upper Arm, Position: Sitting, Cuff Size: Large Adult)   Pulse 74   Temp 98.2 °F (36.8 °C)   Wt 156 lb 6.4 oz (70.9 kg)   SpO2 96%   BMI 26.85 kg/m²   Wt Readings from Last 3 Encounters:   09/09/20 156 lb 6.4 oz (70.9 kg)   06/18/20 155 lb (70.3 kg)   06/10/20 159 lb 6 oz (72.3 kg)     Body mass index is 26.85 kg/m².      General Appearance: alert and cooperative with exam, appears comfortable, no distress  Oral: moist oral mucus membranes  Neck: No jugular venous distention  Lungs: Air entry B/L, no crackles or rales, no use of accessory muscles  Heart: S1, S2 heard  Extremities: No sig LE edema     Medications:  Current Outpatient Medications   Medication Sig Dispense Refill    NIFEdipine (ADALAT CC) 90 MG extended release tablet Take 1 tablet by mouth once daily 90 tablet 1    potassium chloride (KLOR-CON M) 20 MEQ extended release tablet TAKE 1 TABLET BY MOUTH THREE TIMES DAILY 270 tablet 0    Biotin 1000 MCG TABS Take by mouth daily      atenolol (TENORMIN) 50 MG tablet Take 1 tablet by mouth daily 30 tablet 3    levothyroxine (SYNTHROID) 100 MCG tablet TAKE 1 TABLET BY MOUTH ONCE DAILY 30 tablet 11    diphenhydrAMINE (BENADRYL) 25 MG tablet Take 25 mg by mouth every 6 hours as needed for Itching      GARLIC PO Take 1 tablet by mouth daily      vitamin D (CHOLECALCIFEROL) 1000 UNIT TABS tablet Take 1,000 Units by mouth daily. No current facility-administered medications for this visit. Laboratory & Diagnostics:  Radiology/US kidneys:April 2014: L 10.2 cm, R 8.4 cm, No LINDA  Aug 2015: BMP: K 3.5, Creat 1.1, Hb 14.3, UA: no blood, 100 mg/dL protein, UPCR 100 mg/g  Feb 2016: , K 3.7, Creat 0.81  March 2017: Creat 1.09, K 3.6  April 2017: Aldosterone 15, renin less than 0.6     May 2018: K 3.6, Creat 1.4  May 2019: K 3.3, Creat 1.07  Dec 2019: UPCR 300 mg/g  Sept 2020: K 3.8, Creat 0.93     Impression/Plan:   1. Hypokalemia: Patient has borderline hypokalemia. She is on supplementation yet potassium is borderline. I have discussed with patient regarding use of Aldactone which may not only help improve blood pressure but may also help improve potassium. However patient had been very reluctant to make any changes to her medication regimen. This was discussed with patient during previous visits. Today, I again discussed with patient relating the above. Advised Aldactone. This may help improve potassium. She may be able to come off nifedipine. Will make some adjustments as needed. However patient reports that she just filled a bottle of nifedipine and would like to continue current medications at this time.   She will call me when she is ready to make a change. 2. Hypertension: She is on nifedipine and atenolol. Blood pressure stable at this time  3. Right atrophic kidney: Will repeat another US in one year. Discussed with patient. Orders Placed This Encounter   Procedures    US RENAL COMPLETE    Basic Metabolic Panel    Protein / creatinine ratio, urine     Return in about 1 year (around 9/9/2021).     Danae Menjivar MD  [facility]

## 2020-09-28 RX ORDER — ATENOLOL 50 MG/1
TABLET ORAL
Qty: 90 TABLET | Refills: 1 | Status: SHIPPED | OUTPATIENT
Start: 2020-09-28 | End: 2020-09-30 | Stop reason: SDUPTHER

## 2020-09-30 RX ORDER — ATENOLOL 50 MG/1
TABLET ORAL
Qty: 90 TABLET | Refills: 1 | Status: SHIPPED | OUTPATIENT
Start: 2020-09-30 | End: 2021-04-13

## 2020-09-30 NOTE — TELEPHONE ENCOUNTER
Patient called to see why her prescription was not filled. I let her know that Dr. Joshua Lomax had signed it, but it had failed. I called the pharmacy. They said to just resend it so I did.

## 2020-11-02 RX ORDER — POTASSIUM CHLORIDE 20 MEQ/1
TABLET, EXTENDED RELEASE ORAL
Qty: 270 TABLET | Refills: 3 | Status: SHIPPED | OUTPATIENT
Start: 2020-11-02 | End: 2021-11-09 | Stop reason: SDUPTHER

## 2020-12-07 LAB — TSH SERPL DL<=0.05 MIU/L-ACNC: 1.1 MCIU/ML (ref 0.49–4.67)

## 2020-12-10 ENCOUNTER — TELEPHONE (OUTPATIENT)
Dept: FAMILY MEDICINE CLINIC | Age: 60
End: 2020-12-10

## 2020-12-10 NOTE — TELEPHONE ENCOUNTER
----- Message from Daysi Lynn MD sent at 12/9/2020  9:11 PM EST -----  Let her know the TSH was fine and where would she want the RF to go?

## 2020-12-11 RX ORDER — LEVOTHYROXINE SODIUM 0.1 MG/1
TABLET ORAL
Qty: 90 TABLET | Refills: 3 | Status: SHIPPED | OUTPATIENT
Start: 2020-12-11 | End: 2020-12-16 | Stop reason: ALTCHOICE

## 2020-12-15 ASSESSMENT — ENCOUNTER SYMPTOMS
SHORTNESS OF BREATH: 0
CONSTIPATION: 0
SINUS PRESSURE: 0

## 2020-12-16 ENCOUNTER — OFFICE VISIT (OUTPATIENT)
Dept: FAMILY MEDICINE CLINIC | Age: 60
End: 2020-12-16

## 2020-12-16 VITALS
DIASTOLIC BLOOD PRESSURE: 72 MMHG | BODY MASS INDEX: 27.36 KG/M2 | SYSTOLIC BLOOD PRESSURE: 130 MMHG | RESPIRATION RATE: 16 BRPM | HEART RATE: 78 BPM | TEMPERATURE: 97 F | WEIGHT: 160.25 LBS | HEIGHT: 64 IN

## 2020-12-16 PROCEDURE — 99213 OFFICE O/P EST LOW 20 MIN: CPT | Performed by: FAMILY MEDICINE

## 2020-12-16 RX ORDER — LEVOTHYROXINE SODIUM 100 MCG
TABLET ORAL
COMMUNITY
Start: 2020-12-16 | End: 2021-01-04 | Stop reason: SDUPTHER

## 2020-12-16 ASSESSMENT — PATIENT HEALTH QUESTIONNAIRE - PHQ9
SUM OF ALL RESPONSES TO PHQ QUESTIONS 1-9: 0
1. LITTLE INTEREST OR PLEASURE IN DOING THINGS: 0
SUM OF ALL RESPONSES TO PHQ9 QUESTIONS 1 & 2: 0
SUM OF ALL RESPONSES TO PHQ QUESTIONS 1-9: 0
SUM OF ALL RESPONSES TO PHQ QUESTIONS 1-9: 0
2. FEELING DOWN, DEPRESSED OR HOPELESS: 0

## 2020-12-16 NOTE — PROGRESS NOTES
Subjective:      Patient ID: Mendel Alstrom is a 56 y.o. female. HPI  1. She states no questions today  Review of Systems   Constitutional: Negative for fatigue. HENT: Negative for sinus pressure. Eyes: Negative for visual disturbance. Respiratory: Negative for shortness of breath. Cardiovascular: Negative for chest pain. Gastrointestinal: Negative for constipation. Genitourinary: Negative. Musculoskeletal: Negative for arthralgias. Skin: Negative for rash. Neurological: Negative for headaches. The patient's medications, allergies, past medical problems, surgical, social, and family histories were reviewed and updated as needed. Objective:   Physical Exam  Constitutional:       General: She is not in acute distress. Appearance: She is well-developed. HENT:      Head: Normocephalic and atraumatic. Eyes:      General: No scleral icterus. Conjunctiva/sclera: Conjunctivae normal.   Neck:      Trachea: No tracheal deviation. Cardiovascular:      Rate and Rhythm: Normal rate and regular rhythm. Heart sounds: Normal heart sounds. Pulmonary:      Effort: Pulmonary effort is normal.      Breath sounds: Normal breath sounds. Skin:     General: Skin is warm and dry. Neurological:      Mental Status: She is alert and oriented to person, place, and time. Psychiatric:         Behavior: Behavior normal.     Blood pressure 130/72, pulse 78, temperature 97 °F (36.1 °C), temperature source Infrared, resp. rate 16, height 5' 4\" (1.626 m), weight 160 lb 4 oz (72.7 kg), not currently breastfeeding. Assessment:       Diagnosis Orders   1. Essential hypertension     2.  Acquired hypothyroidism  SYNTHROID 100 MCG tablet           Plan:      See me in 6 months        Beverly Brown MD

## 2021-01-04 DIAGNOSIS — E03.9 ACQUIRED HYPOTHYROIDISM: ICD-10-CM

## 2021-01-04 RX ORDER — LEVOTHYROXINE SODIUM 100 MCG
TABLET ORAL
Qty: 90 TABLET | Refills: 3 | Status: SHIPPED | OUTPATIENT
Start: 2021-01-04 | End: 2021-11-09 | Stop reason: SDUPTHER

## 2021-03-05 DIAGNOSIS — I10 ESSENTIAL HYPERTENSION: ICD-10-CM

## 2021-03-05 RX ORDER — NIFEDIPINE 90 MG/1
TABLET, FILM COATED, EXTENDED RELEASE ORAL
Qty: 90 TABLET | Refills: 3 | Status: SHIPPED | OUTPATIENT
Start: 2021-03-05 | End: 2021-11-16 | Stop reason: SDUPTHER

## 2021-03-16 ENCOUNTER — OFFICE VISIT (OUTPATIENT)
Dept: FAMILY MEDICINE CLINIC | Age: 61
End: 2021-03-16

## 2021-03-16 VITALS
HEART RATE: 70 BPM | DIASTOLIC BLOOD PRESSURE: 60 MMHG | TEMPERATURE: 96.8 F | SYSTOLIC BLOOD PRESSURE: 104 MMHG | RESPIRATION RATE: 16 BRPM | BODY MASS INDEX: 27.23 KG/M2 | WEIGHT: 159.5 LBS | HEIGHT: 64 IN

## 2021-03-16 DIAGNOSIS — L02.419 CELLULITIS AND ABSCESS OF UPPER EXTREMITY: Primary | ICD-10-CM

## 2021-03-16 DIAGNOSIS — L03.119 CELLULITIS AND ABSCESS OF UPPER EXTREMITY: Primary | ICD-10-CM

## 2021-03-16 PROCEDURE — 10060 I&D ABSCESS SIMPLE/SINGLE: CPT | Performed by: EMERGENCY MEDICINE

## 2021-03-16 RX ORDER — SULFAMETHOXAZOLE AND TRIMETHOPRIM 800; 160 MG/1; MG/1
1 TABLET ORAL 2 TIMES DAILY
Qty: 20 TABLET | Refills: 0 | Status: SHIPPED | OUTPATIENT
Start: 2021-03-16 | End: 2021-03-26

## 2021-03-16 RX ORDER — CEPHALEXIN 500 MG/1
500 CAPSULE ORAL 3 TIMES DAILY
Qty: 30 CAPSULE | Refills: 0 | Status: SHIPPED | OUTPATIENT
Start: 2021-03-16 | End: 2021-09-10

## 2021-03-16 SDOH — ECONOMIC STABILITY: TRANSPORTATION INSECURITY
IN THE PAST 12 MONTHS, HAS LACK OF TRANSPORTATION KEPT YOU FROM MEETINGS, WORK, OR FROM GETTING THINGS NEEDED FOR DAILY LIVING?: NOT ASKED

## 2021-03-16 SDOH — ECONOMIC STABILITY: TRANSPORTATION INSECURITY
IN THE PAST 12 MONTHS, HAS THE LACK OF TRANSPORTATION KEPT YOU FROM MEDICAL APPOINTMENTS OR FROM GETTING MEDICATIONS?: NOT ASKED

## 2021-03-16 ASSESSMENT — ENCOUNTER SYMPTOMS
WHEEZING: 0
CHEST TIGHTNESS: 0
DIARRHEA: 0
SORE THROAT: 0
COUGH: 0
SHORTNESS OF BREATH: 0
NAUSEA: 0
ABDOMINAL PAIN: 0
RHINORRHEA: 0
CONSTIPATION: 0
SINUS PRESSURE: 0
TROUBLE SWALLOWING: 0
VOMITING: 0
VOICE CHANGE: 0
BACK PAIN: 0

## 2021-03-16 ASSESSMENT — PATIENT HEALTH QUESTIONNAIRE - PHQ9
SUM OF ALL RESPONSES TO PHQ9 QUESTIONS 1 & 2: 0
2. FEELING DOWN, DEPRESSED OR HOPELESS: 0
SUM OF ALL RESPONSES TO PHQ QUESTIONS 1-9: 0
1. LITTLE INTEREST OR PLEASURE IN DOING THINGS: 0

## 2021-03-16 NOTE — PROGRESS NOTES
person, place, and time. Psychiatric:         Behavior: Behavior is cooperative. Assessment:         Diagnosis Orders   1. Cellulitis and abscess of upper extremity ( forearm)  AL DRAIN SKIN ABSCESS SIMPLE       Plan:      Medications Prescribed:  Orders Placed This Encounter   Medications    sulfamethoxazole-trimethoprim (BACTRIM DS) 800-160 MG per tablet     Sig: Take 1 tablet by mouth 2 times daily for 10 days     Dispense:  20 tablet     Refill:  0    mupirocin (BACTROBAN) 2 % ointment     Sig: Apply topically  2  times daily. Dispense:  1 Tube     Refill:  0    cephALEXin (KEFLEX) 500 MG capsule     Sig: Take 1 capsule by mouth 3 times daily 1 tablet 3 times a day     Dispense:  30 capsule     Refill:  0     Orders Placed:  Orders Placed This Encounter   Procedures    AL DRAIN SKIN ABSCESS SIMPLE     ncision and Drainage Procedure Note done by me: Indication: Abscess  Procedure: The patient was positioned appropriately and the skin over the incision site was prepped with betadine and draped in a sterile fashion. Local anesthesia was obtained by infiltration using 1% Lidocaine without epinephrine. A .5 cm incision was then made over the greatest area of fluctuance and immediately creamy material flowed out freely and the rest by gentle compression. The wound was dressed with a sterile dressing, gauze and tape. The patient tolerated the procedure well. Complications: None          Return in about 1 week (around 3/23/2021) for Dr Shakila Whyte. Discussed use, benefit, and side effects of prescribedmedications. All patient questions answered. Pt voiced understanding. Instructedto continue current medications, diet and exercise. Patient agreed with treatmentplan.

## 2021-03-19 LAB
AEROBIC CULTURE: NORMAL
GRAM STAIN RESULT: NORMAL
REPORT STATUS: NORMAL
SITE/TYPE: NORMAL

## 2021-03-23 ENCOUNTER — OFFICE VISIT (OUTPATIENT)
Dept: FAMILY MEDICINE CLINIC | Age: 61
End: 2021-03-23

## 2021-03-23 VITALS
HEART RATE: 68 BPM | RESPIRATION RATE: 16 BRPM | SYSTOLIC BLOOD PRESSURE: 116 MMHG | HEIGHT: 62 IN | TEMPERATURE: 96.4 F | BODY MASS INDEX: 29.49 KG/M2 | DIASTOLIC BLOOD PRESSURE: 68 MMHG | WEIGHT: 160.25 LBS

## 2021-03-23 DIAGNOSIS — L02.419 CELLULITIS AND ABSCESS OF UPPER EXTREMITY: Primary | ICD-10-CM

## 2021-03-23 DIAGNOSIS — L03.119 CELLULITIS AND ABSCESS OF UPPER EXTREMITY: Primary | ICD-10-CM

## 2021-03-23 PROCEDURE — 99213 OFFICE O/P EST LOW 20 MIN: CPT | Performed by: EMERGENCY MEDICINE

## 2021-03-23 ASSESSMENT — ENCOUNTER SYMPTOMS
VOICE CHANGE: 0
NAUSEA: 0
DIARRHEA: 0
COUGH: 0
ABDOMINAL PAIN: 0
CHEST TIGHTNESS: 0
WHEEZING: 0
SORE THROAT: 0
BACK PAIN: 0
CONSTIPATION: 0
TROUBLE SWALLOWING: 0
SHORTNESS OF BREATH: 0
RHINORRHEA: 0
VOMITING: 0
SINUS PRESSURE: 0

## 2021-03-23 NOTE — PROGRESS NOTES
Visit Date: 3/23/2021     Emilie Washington is a 56 y.o. female who presents today for:  Chief Complaint   Patient presents with    Check-Up     1 wk    Abscess     left forearm       HPI:     HPI     Abscess, left forearm getting improving taking Bactrim and Keflex    Current Medication:  has a current medication list which includes the following prescription(s): sulfamethoxazole-trimethoprim, mupirocin, cephalexin, nifedipine, synthroid, potassium chloride, atenolol, biotin, diphenhydramine, garlic, and vitamin d. No Known Allergies       Social History     Tobacco Use    Smoking status: Former Smoker     Packs/day: 1.00     Years: 10.00     Pack years: 10.00     Types: Cigarettes     Quit date: 2017     Years since quittin.1    Smokeless tobacco: Never Used   Substance Use Topics    Alcohol use: No    Drug use: No       Past Medical History:   Diagnosis Date    Chronic kidney disease     Hypertension     Hyperthyroidism     Vitamin D deficiency        Past Surgical History:   Procedure Laterality Date    DILATION AND CURETTAGE OF UTERUS N/A 2020    DILATATION AND CURETTAGE HYSTEROSCOPY performed by Corbin Velázquez MD at [address] [place] [address]  2016    Dr Evan Sullivan       Family History   Problem Relation Age of Onset    Diabetes Mother     High Blood Pressure Mother     High Blood Pressure Father     Diabetes Father      Subjective:     Review of Systems   Constitutional: Negative for appetite change, chills, diaphoresis, fatigue and fever. HENT: Negative for congestion, ear pain, postnasal drip, rhinorrhea, sinus pressure, sneezing, sore throat, trouble swallowing and voice change. Respiratory: Negative for cough, chest tightness, shortness of breath and wheezing. Cardiovascular: Negative for chest pain, palpitations and leg swelling.    Gastrointestinal: Negative for abdominal pain, constipation, diarrhea, nausea and vomiting. Musculoskeletal: Negative for arthralgias, back pain, joint swelling, myalgias, neck pain and neck stiffness. Left forearm cellulitis/ abscess   Neurological: Negative for dizziness, syncope, weakness, light-headedness, numbness and headaches. Objective:   /68 (Site: Right Upper Arm, Position: Sitting, Cuff Size: Medium Adult)   Pulse 68   Temp 96.4 °F (35.8 °C) (Skin)   Resp 16   Ht 5' 2\" (1.575 m)   Wt 160 lb 4 oz (72.7 kg)   BMI 29.31 kg/m²   Wt Readings from Last 3 Encounters:   03/23/21 160 lb 4 oz (72.7 kg)   03/16/21 159 lb 8 oz (72.3 kg)   12/16/20 160 lb 4 oz (72.7 kg)     Physical Exam  Vitals signs reviewed. Constitutional:       Appearance: She is well-developed. HENT:      Head: Normocephalic and atraumatic. Right Ear: External ear normal.      Left Ear: External ear normal.      Nose: Nose normal.   Eyes:      General: No scleral icterus. Conjunctiva/sclera: Conjunctivae normal.      Pupils: Pupils are equal, round, and reactive to light. Neck:      Musculoskeletal: Normal range of motion and neck supple. Thyroid: No thyromegaly. Vascular: No JVD. Cardiovascular:      Rate and Rhythm: Normal rate and regular rhythm. Heart sounds: No murmur. No friction rub. Pulmonary:      Effort: Pulmonary effort is normal.      Breath sounds: Normal breath sounds. No wheezing or rales. Chest:      Chest wall: No tenderness. Abdominal:      General: Bowel sounds are normal.      Palpations: Abdomen is soft. There is no mass. Tenderness: There is no abdominal tenderness. Musculoskeletal:      Comments: Left forearm cellulitis and abscess is improving   Lymphadenopathy:      Cervical: No cervical adenopathy. Skin:     Findings: No rash. Neurological:      Mental Status: She is alert and oriented to person, place, and time. Psychiatric:         Behavior: Behavior is cooperative. Assessment:          Diagnosis Orders   1. Cellulitis and abscess of upper extremity ( forearm)         Plan:      Orders Placed:  No orders of the defined types were placed in this encounter. Medications Prescribed:  No orders of the defined types were placed in this encounter. Return if symptoms worsen or fail to improve. Discusseduse, benefit, and side effects of prescribed medications. All patient questionsanswered. Pt voiced understanding. Instructed to continue current medications,diet and exercise. Patient agreed with treatment plan.

## 2021-04-13 RX ORDER — ATENOLOL 50 MG/1
TABLET ORAL
Qty: 90 TABLET | Refills: 3 | Status: SHIPPED | OUTPATIENT
Start: 2021-04-13 | End: 2022-07-13

## 2021-06-22 ENCOUNTER — OFFICE VISIT (OUTPATIENT)
Dept: FAMILY MEDICINE CLINIC | Age: 61
End: 2021-06-22

## 2021-06-22 VITALS
SYSTOLIC BLOOD PRESSURE: 138 MMHG | HEART RATE: 68 BPM | TEMPERATURE: 96.9 F | HEIGHT: 64 IN | BODY MASS INDEX: 27.36 KG/M2 | WEIGHT: 160.25 LBS | DIASTOLIC BLOOD PRESSURE: 80 MMHG | RESPIRATION RATE: 16 BRPM

## 2021-06-22 DIAGNOSIS — E03.9 ACQUIRED HYPOTHYROIDISM: ICD-10-CM

## 2021-06-22 DIAGNOSIS — M25.551 RIGHT HIP PAIN: ICD-10-CM

## 2021-06-22 DIAGNOSIS — E55.9 VITAMIN D DEFICIENCY: ICD-10-CM

## 2021-06-22 DIAGNOSIS — Z12.31 VISIT FOR SCREENING MAMMOGRAM: ICD-10-CM

## 2021-06-22 DIAGNOSIS — I10 ESSENTIAL HYPERTENSION: ICD-10-CM

## 2021-06-22 DIAGNOSIS — Z00.00 WELL ADULT EXAM: Primary | ICD-10-CM

## 2021-06-22 DIAGNOSIS — Z12.11 SCREEN FOR COLON CANCER: ICD-10-CM

## 2021-06-22 PROCEDURE — 99396 PREV VISIT EST AGE 40-64: CPT | Performed by: FAMILY MEDICINE

## 2021-06-22 ASSESSMENT — ENCOUNTER SYMPTOMS
SINUS PRESSURE: 0
CONSTIPATION: 0
SHORTNESS OF BREATH: 0

## 2021-06-22 ASSESSMENT — PATIENT HEALTH QUESTIONNAIRE - PHQ9
SUM OF ALL RESPONSES TO PHQ QUESTIONS 1-9: 0
SUM OF ALL RESPONSES TO PHQ9 QUESTIONS 1 & 2: 0
2. FEELING DOWN, DEPRESSED OR HOPELESS: 0
SUM OF ALL RESPONSES TO PHQ QUESTIONS 1-9: 0
SUM OF ALL RESPONSES TO PHQ QUESTIONS 1-9: 0
1. LITTLE INTEREST OR PLEASURE IN DOING THINGS: 0

## 2021-06-22 NOTE — PATIENT INSTRUCTIONS
Do the xrays of the pelvis and hip when you're ready to  Do the mammogram  Get the fasting labs soon  See me in 6 month

## 2021-06-22 NOTE — PROGRESS NOTES
Eugenio Fregoso (:  1960) is a 56 y.o. female,Established patient, here for evaluation of the following chief complaint(s):  No chief complaint on file. ASSESSMENT/PLAN:       Do the xrays of the pelvis and hip when you're ready to  Do the mammogram  Get the fasting labs soon  See me in 6 month    Subjective   SUBJECTIVE/OBJECTIVE:  HPI  Well Adult Physical   Patient here for a comprehensive physical exam.The patient reports problems - the posterior right hip is uncomfortable the past few months. It seems to be associated with having gas or a BM. She doesn't want colonoscopy but agreed to cologard  Do you take any herbs or supplements that were not prescribed by a doctor? no Are you taking calcium supplements? no Are you taking aspirin daily? no   History:  She sees the GYN    Review of Systems   Constitutional: Negative for fatigue. HENT: Negative for sinus pressure. Eyes: Negative for visual disturbance. Respiratory: Negative for shortness of breath. Cardiovascular: Negative for chest pain. Gastrointestinal: Negative for constipation. Genitourinary: Negative. Musculoskeletal: Negative for arthralgias. Skin: Negative for rash. Neurological: Negative for headaches. The patient's medications, allergies, past medical problems, surgical, social, and family histories were reviewed and updated as needed. Objective   Physical Exam  Constitutional:       General: She is not in acute distress. Appearance: She is well-developed. HENT:      Head: Normocephalic and atraumatic. Right Ear: External ear normal.      Left Ear: External ear normal.      Nose: Nose normal.      Mouth/Throat:      Pharynx: No oropharyngeal exudate. Eyes:      General: No scleral icterus. Conjunctiva/sclera: Conjunctivae normal.   Neck:      Thyroid: No thyromegaly. Vascular: No carotid bruit. Trachea: No tracheal deviation.    Cardiovascular:      Rate and Rhythm: Normal rate and regular rhythm. Heart sounds: Normal heart sounds. Pulmonary:      Effort: Pulmonary effort is normal.      Breath sounds: Normal breath sounds. Abdominal:      General: Bowel sounds are normal.      Palpations: Abdomen is soft. There is no mass. Musculoskeletal:      Cervical back: Neck supple. Lymphadenopathy:      Cervical: No cervical adenopathy. Skin:     General: Skin is warm and dry. Neurological:      Mental Status: She is alert and oriented to person, place, and time. Psychiatric:         Behavior: Behavior normal.     Blood pressure 138/80, pulse 68, temperature 96.9 °F (36.1 °C), temperature source Skin, resp. rate 16, height 5' 4\" (1.626 m), weight 160 lb 4 oz (72.7 kg), not currently breastfeeding. An electronic signature was used to authenticate this note.     --Kenny Anderson MD

## 2021-09-10 ENCOUNTER — TELEPHONE (OUTPATIENT)
Dept: FAMILY MEDICINE CLINIC | Age: 61
End: 2021-09-10

## 2021-09-10 NOTE — TELEPHONE ENCOUNTER
Manuel Melissa called to let you know that pt is currently admitted at [facility] with Covid. She stated that pt called other day to get a zpac called in for her but nothing was called in. She doesn't understand why our office couldn't just call something in for patient. I didn't see a previous message.      Manuel Melissa may be reached at [phone]

## 2021-09-10 NOTE — TELEPHONE ENCOUNTER
I spoke with Dixie Kunz about how I have nothing in the chart about her mom calling. She states she is on bipap.

## 2021-09-17 ENCOUNTER — TELEPHONE (OUTPATIENT)
Dept: FAMILY MEDICINE CLINIC | Age: 61
End: 2021-09-17

## 2021-09-17 NOTE — TELEPHONE ENCOUNTER
Daughter, Patricia Garcia dropped off FMLA req doctor to fill out to cover pt due to hospitalization for covid. Please fax to number on form once completed.     Please call Patriica Garcia once form faxed

## 2021-09-20 NOTE — TELEPHONE ENCOUNTER
Daughter, Jammie Rodriguez called asking about this FMLA form. She said that it has to be turned in by 9/22/21. Please call her when done.

## 2021-09-22 NOTE — TELEPHONE ENCOUNTER
Let her daughter know I faxed the forms late in the afternoon yesterday and I mentioned that she is still in the hospital

## 2021-10-19 ENCOUNTER — OFFICE VISIT (OUTPATIENT)
Dept: FAMILY MEDICINE CLINIC | Age: 61
End: 2021-10-19

## 2021-10-19 VITALS
HEART RATE: 70 BPM | RESPIRATION RATE: 16 BRPM | TEMPERATURE: 97.7 F | HEIGHT: 65 IN | SYSTOLIC BLOOD PRESSURE: 118 MMHG | DIASTOLIC BLOOD PRESSURE: 60 MMHG | BODY MASS INDEX: 24.51 KG/M2 | WEIGHT: 147.13 LBS

## 2021-10-19 DIAGNOSIS — R29.898 MUSCULAR DECONDITIONING: ICD-10-CM

## 2021-10-19 DIAGNOSIS — U07.1 COVID-19: Primary | ICD-10-CM

## 2021-10-19 PROBLEM — J12.82 PNEUMONIA DUE TO COVID-19 VIRUS: Status: ACTIVE | Noted: 2021-10-19

## 2021-10-19 PROBLEM — R79.9 ABNORMAL SERUM TOTAL PROTEIN LEVEL: Status: ACTIVE | Noted: 2021-10-19

## 2021-10-19 PROBLEM — K59.00 CONSTIPATION: Status: ACTIVE | Noted: 2021-10-19

## 2021-10-19 PROCEDURE — 99214 OFFICE O/P EST MOD 30 MIN: CPT | Performed by: FAMILY MEDICINE

## 2021-10-19 RX ORDER — PREDNISONE 20 MG/1
20 TABLET ORAL DAILY
COMMUNITY
End: 2021-10-19 | Stop reason: ALTCHOICE

## 2021-10-19 RX ORDER — FUROSEMIDE 80 MG
40 TABLET ORAL 2 TIMES DAILY
COMMUNITY
End: 2021-11-09 | Stop reason: SDUPTHER

## 2021-10-19 RX ORDER — PANTOPRAZOLE SODIUM 40 MG/1
40 GRANULE, DELAYED RELEASE ORAL
COMMUNITY
End: 2021-11-09 | Stop reason: SDUPTHER

## 2021-10-19 RX ORDER — BENZONATATE 100 MG/1
100-200 CAPSULE ORAL 3 TIMES DAILY PRN
Qty: 60 CAPSULE | Refills: 5 | Status: SHIPPED | OUTPATIENT
Start: 2021-10-19 | End: 2021-10-20 | Stop reason: SDUPTHER

## 2021-10-19 RX ORDER — BENZONATATE 100 MG/1
100 CAPSULE ORAL 3 TIMES DAILY PRN
COMMUNITY
End: 2021-11-09 | Stop reason: SDUPTHER

## 2021-10-19 ASSESSMENT — ENCOUNTER SYMPTOMS
SINUS PRESSURE: 0
SHORTNESS OF BREATH: 1
CONSTIPATION: 0

## 2021-10-19 NOTE — PROGRESS NOTES
Daina Baugh (:  1960) is a 59 y.o. female,Established patient, here for evaluation of the following chief complaint(s): Other ([place] from [facility])         ASSESSMENT/PLAN:      Diagnosis Orders   1. COVID-19  benzonatate (TESSALON) 100 MG capsule   2. Muscular deconditioning  Shower chair     Shower chair as she is unable to use a bathtub  Renew the tessalon perles  Let me know if she is going to see Dr Trenton Jc soon. If not I can order a CXR and discuss titration downward of the oxygen. \  I asked her not to self wean from the oxygen as to rely on how she was feeling would not be accurate     Subjective   SUBJECTIVE/OBJECTIVE:  HPI  1. We discussed her complicated COVID illness and how she went to a [facility]  2. Her daughter hopes she can see Dr Trenton Jc next week  3. We wonders about weaning from the oxygen and going to work  Review of Systems   Constitutional: Positive for fatigue. HENT: Negative for sinus pressure. Eyes: Negative for visual disturbance. Respiratory: Positive for shortness of breath. Cardiovascular: Negative for chest pain. Gastrointestinal: Negative for constipation. Genitourinary: Negative. Musculoskeletal: Negative for arthralgias. Skin: Negative for rash. Neurological: Positive for weakness. Negative for headaches. Objective   Physical Exam  Constitutional:       General: She is not in acute distress. Appearance: She is well-developed. HENT:      Head: Normocephalic and atraumatic. Eyes:      General: No scleral icterus. Conjunctiva/sclera: Conjunctivae normal.   Neck:      Trachea: No tracheal deviation. Cardiovascular:      Rate and Rhythm: Normal rate and regular rhythm. Heart sounds: Normal heart sounds. Pulmonary:      Effort: Pulmonary effort is normal.      Breath sounds: Normal breath sounds. Skin:     General: Skin is warm and dry.    Neurological:      Mental Status: She is alert and oriented to person, place, and time. Psychiatric:         Behavior: Behavior normal.     Blood pressure 118/60, pulse 70, temperature 97.7 °F (36.5 °C), temperature source Skin, resp. rate 16, height 5' 5\" (1.651 m), weight 147 lb 2 oz (66.7 kg), not currently breastfeeding. An electronic signature was used to authenticate this note.     --Evangelina Austin MD

## 2021-10-20 ENCOUNTER — TELEPHONE (OUTPATIENT)
Dept: FAMILY MEDICINE CLINIC | Age: 61
End: 2021-10-20

## 2021-10-20 DIAGNOSIS — U07.1 PNEUMONIA DUE TO COVID-19 VIRUS: Primary | ICD-10-CM

## 2021-10-20 DIAGNOSIS — J12.82 PNEUMONIA DUE TO COVID-19 VIRUS: Primary | ICD-10-CM

## 2021-10-20 RX ORDER — MULTIVIT WITH MINERALS/LUTEIN
250 TABLET ORAL DAILY
COMMUNITY

## 2021-10-20 RX ORDER — BENZONATATE 100 MG/1
100-200 CAPSULE ORAL 3 TIMES DAILY PRN
Qty: 60 CAPSULE | Refills: 5 | Status: SHIPPED | OUTPATIENT
Start: 2021-10-20 | End: 2021-10-27

## 2021-10-20 NOTE — TELEPHONE ENCOUNTER
I will order a CXR she could do sometime at [facility].  We will notify Donny Burleson to check a pulse ox on 6 liters with activity and 2 liters at rest and see if the sats can stay over 90% IV zofran Prn for nausea.   tolerating po

## 2021-10-20 NOTE — PROGRESS NOTES
Date of last visit:  6/22/2021  Date of next visit:  10/20/2021    Requested Prescriptions     Signed Prescriptions Disp Refills    benzonatate (TESSALON) 100 MG capsule 60 capsule 5     Sig: Take 1-2 capsules by mouth 3 times daily as needed for Cough       Resent as Transmission to the Pharmacy failed.

## 2021-10-20 NOTE — TELEPHONE ENCOUNTER
Pt called said that she is going to go ahead and extend her off work. Said that she cannot get into the pulmonologist until November 24th. Said that Melissa Medrano is going to contact us to get our fax number to fill out more paperwork.

## 2021-10-21 NOTE — TELEPHONE ENCOUNTER
Pt informed. Xray order faxed.     Please order [facility] for pulse Ox

## 2021-10-25 ENCOUNTER — TELEPHONE (OUTPATIENT)
Dept: FAMILY MEDICINE CLINIC | Age: 61
End: 2021-10-25

## 2021-10-26 NOTE — TELEPHONE ENCOUNTER
Let her know that the CXR shows some infection and I would like to speak to Dr Keegan Scherer tomorrow.

## 2021-10-27 ENCOUNTER — TELEPHONE (OUTPATIENT)
Dept: FAMILY MEDICINE CLINIC | Age: 61
End: 2021-10-27

## 2021-10-27 DIAGNOSIS — J85.1 ABSCESS OF LUNG WITH PNEUMONIA, UNSPECIFIED LATERALITY (HCC): Primary | ICD-10-CM

## 2021-10-27 NOTE — TELEPHONE ENCOUNTER
Called Dr. Israel Courts office and gave them Dr. Jenell Cranker cell number and to have them call Dr. Trevin Caballero.

## 2021-10-27 NOTE — TELEPHONE ENCOUNTER
I spoke to him earlier and he wished me to order a CT of the Chest and he would see her at the already scheduled time. He said that this was not unusual after severe covid and that  as long as the pulse ox was stable and there was no fever the already scheduled time would be fine.

## 2021-10-27 NOTE — TELEPHONE ENCOUNTER
Daughter, Tayolr Calle dropped off FMLA req doctor to fill out to cover pt due to covid recovery needs.     Please fax to number on form once completed.     Please call [facility] once form faxed

## 2021-10-27 NOTE — TELEPHONE ENCOUNTER
----- Message from Sonia Montiel MD sent at 10/26/2021  9:27 PM EDT -----  I spoke to her daughter and mentioned the possible abscess.  I told her I was hoping to speak to Dr Candice Santos in the morning 10/27/21

## 2021-10-28 ENCOUNTER — TELEPHONE (OUTPATIENT)
Dept: FAMILY MEDICINE CLINIC | Age: 61
End: 2021-10-28

## 2021-10-28 NOTE — TELEPHONE ENCOUNTER
Pt is scheduled for CT Chest at [facility] on 11/2/2021 at 8:30 am. Pt is to arrive 15 mins early. NPO 4 hours prior to procedure. Pt is allowed to take meds with a little bit of water. Pt informed but is going to call us back to see if her daughter can take her.

## 2021-10-28 NOTE — TELEPHONE ENCOUNTER
Patient informed and she is wanting to re-schedule it to Monday. Gave her [facility] scheduling phone number to re-schedule it herself.

## 2021-10-29 ENCOUNTER — TELEPHONE (OUTPATIENT)
Dept: FAMILY MEDICINE CLINIC | Age: 61
End: 2021-10-29

## 2021-10-29 NOTE — TELEPHONE ENCOUNTER
MOM to return call to office  Wondering when patient start date for fmla, and wondering when patient was thinking of a end date. Dr. Nuvia Nam thought patient's daughter might want to take off continually til end of year.

## 2021-11-02 ENCOUNTER — TELEPHONE (OUTPATIENT)
Dept: FAMILY MEDICINE CLINIC | Age: 61
End: 2021-11-02

## 2021-11-02 NOTE — TELEPHONE ENCOUNTER
Faxed result to dr. Jose Staples office. Called there office and it gives message and then hangs up on you.

## 2021-11-02 NOTE — TELEPHONE ENCOUNTER
----- Message from Wayne Howard MD sent at 11/2/2021  1:03 PM EDT -----  Could we fax the report to Dr Karin Alcocer and can I speak to him now.

## 2021-11-02 NOTE — TELEPHONE ENCOUNTER
Let her know that we faxed the report to him and have been trying to speak to him. Ask her to call their office tomorrow to see if she can be seen soon and go to the ED if she worsens in any way.

## 2021-11-04 NOTE — TELEPHONE ENCOUNTER
I spoke to [place] last evening and we discussed going to the ED if she was unable to reach the pulmonologist.

## 2021-11-09 ENCOUNTER — TELEPHONE (OUTPATIENT)
Dept: FAMILY MEDICINE CLINIC | Age: 61
End: 2021-11-09

## 2021-11-09 DIAGNOSIS — E03.9 ACQUIRED HYPOTHYROIDISM: ICD-10-CM

## 2021-11-09 RX ORDER — PANTOPRAZOLE SODIUM 40 MG/1
40 GRANULE, DELAYED RELEASE ORAL
Qty: 30 EACH | Refills: 5 | Status: SHIPPED | OUTPATIENT
Start: 2021-11-09 | End: 2022-01-10

## 2021-11-09 RX ORDER — BENZONATATE 100 MG/1
100 CAPSULE ORAL 3 TIMES DAILY PRN
Qty: 100 CAPSULE | Refills: 3 | Status: SHIPPED | OUTPATIENT
Start: 2021-11-09

## 2021-11-09 RX ORDER — FUROSEMIDE 80 MG
40 TABLET ORAL 2 TIMES DAILY
Qty: 60 TABLET | Refills: 5 | Status: SHIPPED | OUTPATIENT
Start: 2021-11-09 | End: 2022-01-10 | Stop reason: DRUGHIGH

## 2021-11-09 RX ORDER — POTASSIUM CHLORIDE 20 MEQ/1
TABLET, EXTENDED RELEASE ORAL
Qty: 90 TABLET | Refills: 5 | Status: SHIPPED | OUTPATIENT
Start: 2021-11-09 | End: 2022-07-21

## 2021-11-09 RX ORDER — LEVOTHYROXINE SODIUM 100 MCG
TABLET ORAL
Qty: 30 TABLET | Refills: 5 | Status: SHIPPED | OUTPATIENT
Start: 2021-11-09 | End: 2022-06-10 | Stop reason: SDUPTHER

## 2021-11-09 NOTE — TELEPHONE ENCOUNTER
Julián Amanda Salmeron: [id number]  PA Case ID: [id number]  Rx #: [id number] help? Call us at [phone]  Status  Sent to [employer]  Drug  Pantoprazole Sodium 40MG packets  Form  [employer] Electronic Prior Authorization Form (2017 NCPDP)  Original Claim Info  79 Non-Formulary Drug Non-Formulary Drug  ?  Prescriber Instructions      Pa pending through cover my meds

## 2021-11-09 NOTE — TELEPHONE ENCOUNTER
Pt called requesting 30 days with refills of following:    Pantoprazole 40 mg one every morning  Potassium chloride 20 meq one daily  Benzonatate 100 mg one 3 times daily  Synthroid 100 mcg one tablet daily  Furosemide 20 mg take two daily    [address]

## 2021-11-09 NOTE — TELEPHONE ENCOUNTER
Date of last visit:  10/19/2021  Date of next visit:  Visit date not found    Requested Prescriptions     Pending Prescriptions Disp Refills    pantoprazole sodium (PROTONIX) 40 MG PACK packet 30 each      Sig: Take 1 packet by mouth every morning (before breakfast)    furosemide (LASIX) 80 MG tablet 60 tablet      Sig: Take 0.5 tablets by mouth 2 times daily    SYNTHROID 100 MCG tablet 90 tablet 3     Sig: TAKE 1 TABLET BY MOUTH ONCE DAILY    potassium chloride (KLOR-CON M) 20 MEQ extended release tablet 270 tablet 3     Sig: TAKE 1 TABLET BY MOUTH THREE TIMES DAILY    benzonatate (TESSALON) 100 MG capsule       Sig: Take 1 capsule by mouth 3 times daily as needed for Cough

## 2021-11-09 NOTE — TELEPHONE ENCOUNTER
Patient saw dr. Yazan Redding today, cat scanned showed what appears to be a cyst, and should not cause any issues. Patient will follow up with him in [address]. Also she is getting pft done. Going to do pulmonary rehab, they are weaning her off 02. She is now on 3 liters. This was from daughter.

## 2021-11-10 ENCOUNTER — TELEPHONE (OUTPATIENT)
Dept: FAMILY MEDICINE CLINIC | Age: 61
End: 2021-11-10

## 2021-11-10 NOTE — TELEPHONE ENCOUNTER
Pt called and is wanting to talk to Dr Karin Archuleta regarding her short term disability. Said  can call her any time, during office hours or after office hours.

## 2021-11-15 NOTE — TELEPHONE ENCOUNTER
We discussed how she stated her disability company will be sending forms. We agreed upon an estimated return to work date of 3/1/2022.

## 2021-11-16 ENCOUNTER — TELEPHONE (OUTPATIENT)
Dept: NEPHROLOGY | Age: 61
End: 2021-11-16

## 2021-11-16 DIAGNOSIS — I10 ESSENTIAL HYPERTENSION: ICD-10-CM

## 2021-11-16 RX ORDER — NIFEDIPINE 90 MG/1
TABLET, FILM COATED, EXTENDED RELEASE ORAL
Qty: 30 TABLET | Refills: 0 | Status: SHIPPED | OUTPATIENT
Start: 2021-11-16 | End: 2022-03-21

## 2021-11-16 NOTE — TELEPHONE ENCOUNTER
Patient wants a refill on her Nifedipine. She has not been seen since September of last year. She had to cancel because she had Covid but now she is out of medication. Your next available appointment she cannot come to because she has to get testing done on her lungs. The next available is in January. What would you like to do?

## 2022-01-05 DIAGNOSIS — N18.30 STAGE 3 CHRONIC KIDNEY DISEASE, UNSPECIFIED WHETHER STAGE 3A OR 3B CKD (HCC): Primary | ICD-10-CM

## 2022-01-07 ENCOUNTER — TELEPHONE (OUTPATIENT)
Dept: NEPHROLOGY | Age: 62
End: 2022-01-07

## 2022-01-07 LAB
ANION GAP SERPL CALCULATED.3IONS-SCNC: 6 MMOL/L (ref 4–12)
BUN BLDV-MCNC: 14 MG/DL (ref 7–20)
CALCIUM SERPL-MCNC: 9.7 MG/DL (ref 8.8–10.5)
CHLORIDE BLD-SCNC: 101 MEQ/L (ref 101–111)
CO2: 33 MEQ/L (ref 21–32)
CREAT SERPL-MCNC: 0.83 MG/DL (ref 0.6–1.3)
CREATININE CLEARANCE: >60
GLUCOSE: 61 MG/DL (ref 70–110)
POTASSIUM SERPL-SCNC: 3.4 MEQ/L (ref 3.6–5)
SODIUM BLD-SCNC: 140 MEQ/L (ref 135–145)

## 2022-01-07 NOTE — TELEPHONE ENCOUNTER
----- Message from Lisandra Edward MD sent at 1/7/2022  3:50 PM EST -----  K is still low  Is she taking K-dur 20 meq TID?

## 2022-01-10 ENCOUNTER — OFFICE VISIT (OUTPATIENT)
Dept: NEPHROLOGY | Age: 62
End: 2022-01-10
Payer: COMMERCIAL

## 2022-01-10 VITALS
SYSTOLIC BLOOD PRESSURE: 126 MMHG | WEIGHT: 150 LBS | OXYGEN SATURATION: 92 % | HEART RATE: 73 BPM | DIASTOLIC BLOOD PRESSURE: 80 MMHG | BODY MASS INDEX: 24.96 KG/M2 | TEMPERATURE: 97.5 F

## 2022-01-10 DIAGNOSIS — E87.6 HYPOKALEMIA: Primary | ICD-10-CM

## 2022-01-10 DIAGNOSIS — N26.1 RENAL ATROPHY: ICD-10-CM

## 2022-01-10 PROCEDURE — 99213 OFFICE O/P EST LOW 20 MIN: CPT | Performed by: INTERNAL MEDICINE

## 2022-01-10 RX ORDER — FUROSEMIDE 40 MG/1
40 TABLET ORAL DAILY
Qty: 180 TABLET | Refills: 3 | Status: SHIPPED | OUTPATIENT
Start: 2022-01-10

## 2022-01-10 RX ORDER — FUROSEMIDE 80 MG
40 TABLET ORAL DAILY
Qty: 30 TABLET | Refills: 3
Start: 2022-01-10 | End: 2022-01-10 | Stop reason: DRUGHIGH

## 2022-01-10 NOTE — PROGRESS NOTES
[facility]    [facility], [address] [place] [address] [address]   [address] [facility]  [phone]  Progress Note  1/10/2022 1:42 PM        Pt Name:    Fernando Garcia  YOB: 1960  Primary Care Physician:  Cari Perez MD     Chief Complaint:   Chief Complaint   Patient presents with    Follow-up     Hypokalemia, HTN        Background Information/Interval History:   The patient is a 59 year old AA female who was following Dr. Benita Magallanes previously. She has hx hypertension for 15+ years, hx CKD II,  vitamin D deficiency and hypothyroidism. She is here for follow-up. She missed her last appt in 2021. She feels well. She has no complaints. Bp at home is usually okay per patient. She was not taking enough potassium but now she is taking K-dur 20 meq TID. She is on atenolol and nifedipine. She is on lasix 40 mg BID per PCP office. She reports she was placed on lasix last year when she had COVID. She says she is only taking one tab lasix. Past History:  Past Medical History:   Diagnosis Date    Chronic kidney disease     Hypertension 2001    Hyperthyroidism 1989    Vitamin D deficiency      Past Surgical History:   Procedure Laterality Date    DILATION AND CURETTAGE OF UTERUS N/A 6/18/2020    DILATATION AND CURETTAGE HYSTEROSCOPY performed by Belkys Millan MD at [address] [facility]  11/18/2016    Dr Bee Preston:  /80 (Site: Left Upper Arm, Position: Sitting, Cuff Size: Medium Adult)   Pulse 73   Temp 97.5 °F (36.4 °C)   Wt 150 lb (68 kg)   SpO2 92%   BMI 24.96 kg/m²   Wt Readings from Last 3 Encounters:   01/10/22 150 lb (68 kg)   10/19/21 147 lb 2 oz (66.7 kg)   06/22/21 160 lb 4 oz (72.7 kg)     Body mass index is 24.96 kg/m².      General Appearance: alert and cooperative with exam, appears comfortable, no distress  Oral: moist oral mucus membranes  Neck: No jugular venous distention  Lungs: Air entry B/L, no crackles or rales, no use of accessory muscles  Heart: S1, S2 heard  Extremities: No sig LE edema     Medications:  Current Outpatient Medications   Medication Sig Dispense Refill    Ferrous Gluconate (IRON 27 PO) Take 27 mg by mouth daily      Doxylamine Succinate, Sleep, (SLEEP AID PO) Take 25 mg by mouth      NIFEdipine (ADALAT CC) 90 MG extended release tablet Take 1 tablet by mouth once daily 30 tablet 0    furosemide (LASIX) 80 MG tablet Take 0.5 tablets by mouth 2 times daily 60 tablet 5    SYNTHROID 100 MCG tablet TAKE 1 TABLET BY MOUTH ONCE DAILY 30 tablet 5    potassium chloride (KLOR-CON M) 20 MEQ extended release tablet TAKE 1 TABLET BY MOUTH THREE TIMES DAILY 90 tablet 5    benzonatate (TESSALON) 100 MG capsule Take 1 capsule by mouth 3 times daily as needed for Cough 100 capsule 3    Ascorbic Acid (VITAMIN C) 250 MG tablet Take 250 mg by mouth daily      atenolol (TENORMIN) 50 MG tablet Take 1 tablet by mouth once daily 90 tablet 3    GARLIC PO Take 1 tablet by mouth daily      vitamin D (CHOLECALCIFEROL) 1000 UNIT TABS tablet Take 1,000 Units by mouth daily. No current facility-administered medications for this visit. Laboratory & Diagnostics:  Radiology/US kidneys:April 2014: L 10.2 cm, R 8.4 cm, No LINDA  Aug 2015: BMP: K 3.5, Creat 1.1, Hb 14.3, UA: no blood, 100 mg/dL protein, UPCR 100 mg/g  Feb 2016: , K 3.7, Creat 0.81  March 2017: Creat 1.09, K 3.6  April 2017: Aldosterone 15, renin less than 0.6     May 2018: K 3.6, Creat 1.4  May 2019: K 3.3, Creat 1.07  Dec 2019: UPCR 300 mg/g  Sept 2020: K 3.8, Creat 0.93  Jan 2022: K 3.4, Creat 0.83, Sodium 140, UPCR 80 mg/g     Impression/Plan:   1. Hypokalemia: due to diuretics. She was not taking potassium as prescribed. She will now start taking K-dur 20 meq TID. Will check potassium in two weeks. 2. Hypertension: She is on nifedipine and atenolol. She says BP at home is usuallyin 130 range.    3. Right atrophic kidney: Will get updated kidney US. 4. Peripheral edema: improved. On lasix 40 mg BId. She is seeing PCP. Lasix being prescribed by PCP office. She would like to reduce lasix to 40 mg daily. She has no edema at this time. Orders Placed This Encounter   Procedures    US RENAL COMPLETE    Basic Metabolic Panel    Protein / creatinine ratio, urine    Basic Metabolic Panel     Return in about 1 year (around 1/10/2023).         Xenia Barton MD  [facility]

## 2022-01-19 ENCOUNTER — TELEPHONE (OUTPATIENT)
Dept: FAMILY MEDICINE CLINIC | Age: 62
End: 2022-01-19

## 2022-02-01 ENCOUNTER — TELEPHONE (OUTPATIENT)
Dept: NEPHROLOGY | Age: 62
End: 2022-02-01

## 2022-02-17 ENCOUNTER — OFFICE VISIT (OUTPATIENT)
Dept: FAMILY MEDICINE CLINIC | Age: 62
End: 2022-02-17

## 2022-02-17 VITALS
BODY MASS INDEX: 25.22 KG/M2 | HEART RATE: 70 BPM | SYSTOLIC BLOOD PRESSURE: 132 MMHG | DIASTOLIC BLOOD PRESSURE: 80 MMHG | RESPIRATION RATE: 14 BRPM | WEIGHT: 151.38 LBS | TEMPERATURE: 96.7 F | HEIGHT: 65 IN

## 2022-02-17 DIAGNOSIS — U07.1 PNEUMONIA DUE TO COVID-19 VIRUS: Primary | ICD-10-CM

## 2022-02-17 DIAGNOSIS — J12.82 PNEUMONIA DUE TO COVID-19 VIRUS: Primary | ICD-10-CM

## 2022-02-17 DIAGNOSIS — I10 ESSENTIAL HYPERTENSION: ICD-10-CM

## 2022-02-17 PROCEDURE — 99213 OFFICE O/P EST LOW 20 MIN: CPT | Performed by: FAMILY MEDICINE

## 2022-02-17 ASSESSMENT — ENCOUNTER SYMPTOMS
SHORTNESS OF BREATH: 0
SINUS PRESSURE: 0
CONSTIPATION: 0

## 2022-02-17 NOTE — LETTER
[facility] [address] [id number]  Phone: [phone]  Fax: [phone]    Kindra Galvez MD        February 17, 2022     Patient: Shirley Quinn   YOB: 1960   Date of Visit: 2/17/2022       To Whom it May Concern:    Shirley Quinn was seen in my clinic on 2/17/2022. She may return to work on 3/1/2022. .    If you have any questions or concerns, please don't hesitate to call.     Sincerely,         Kindra Galvez MD

## 2022-03-19 DIAGNOSIS — I10 ESSENTIAL HYPERTENSION: ICD-10-CM

## 2022-03-21 RX ORDER — NIFEDIPINE 90 MG/1
TABLET, FILM COATED, EXTENDED RELEASE ORAL
Qty: 90 TABLET | Refills: 3 | Status: SHIPPED | OUTPATIENT
Start: 2022-03-21

## 2022-04-13 ENCOUNTER — HOSPITAL ENCOUNTER (OUTPATIENT)
Dept: CT IMAGING | Age: 62
Discharge: HOME | End: 2022-04-13
Payer: COMMERCIAL

## 2022-04-13 DIAGNOSIS — S92.255A NONDISPLACED FRACTURE OF NAVICULAR (SCAPHOID) OF LEFT FOOT, INITIAL ENCOUNTER FOR CLOSED FRACTURE: ICD-10-CM

## 2022-04-13 PROCEDURE — 73700 CT LOWER EXTREMITY W/O DYE: CPT

## 2022-04-25 NOTE — TELEPHONE ENCOUNTER
Date of last visit:  2/17/2022  Date of next visit:  7/5/2022    Requested Prescriptions     Pending Prescriptions Disp Refills    benzonatate (TESSALON) 100 MG capsule [Pharmacy Med Name: Benzonatate 100 MG Oral Capsule] 100 capsule 0     Sig: Take 1 capsule by mouth three times daily as needed for cough

## 2022-04-26 RX ORDER — BENZONATATE 100 MG/1
CAPSULE ORAL
Qty: 100 CAPSULE | Refills: 0 | OUTPATIENT
Start: 2022-04-26

## 2022-04-26 NOTE — TELEPHONE ENCOUNTER
Pt said that she did call and ask for the refill. Said that not currently coughing really but just has mucous. She said if you think she should have the Rx then she will take it. If not, that is fine also.

## 2022-06-10 DIAGNOSIS — E03.9 ACQUIRED HYPOTHYROIDISM: ICD-10-CM

## 2022-06-10 DIAGNOSIS — E03.9 ACQUIRED HYPOTHYROIDISM: Primary | ICD-10-CM

## 2022-06-10 DIAGNOSIS — I10 ESSENTIAL HYPERTENSION: ICD-10-CM

## 2022-06-10 RX ORDER — LEVOTHYROXINE SODIUM 100 MCG
TABLET ORAL
Qty: 30 TABLET | Refills: 0 | OUTPATIENT
Start: 2022-06-10

## 2022-06-10 RX ORDER — LEVOTHYROXINE SODIUM 100 MCG
TABLET ORAL
Qty: 15 TABLET | Refills: 0 | Status: SHIPPED | OUTPATIENT
Start: 2022-06-10 | End: 2022-06-24 | Stop reason: SDUPTHER

## 2022-06-10 NOTE — TELEPHONE ENCOUNTER
Pt informed. She is completely out and needing some called in. Pt is going to go next week to get labs done.     Sent labs to [facility]

## 2022-06-10 NOTE — TELEPHONE ENCOUNTER
I would like her to have some fasting labs done before the refill. Will she need a small number of pills to last till the result returns?

## 2022-06-10 NOTE — TELEPHONE ENCOUNTER
Date of last visit:  2/17/2022  Date of next visit:  7/5/2022    Requested Prescriptions     Pending Prescriptions Disp Refills    SYNTHROID 100 MCG tablet [Pharmacy Med Name: Synthroid 100 MCG Oral Tablet] 30 tablet 0     Sig: Take 1 tablet by mouth once daily

## 2022-06-20 NOTE — TELEPHONE ENCOUNTER
Michele Esteves (Key: [id number]) - [id number]  Synthroid 100MCG tablets       Status: PA Response - Approved    Created: June 10th, 2022 [id number]    Sent: June 10th, 2022    Open  Archive

## 2022-06-22 LAB
A/G RATIO: 1.2 (ref 1.5–2.5)
ALBUMIN SERPL-MCNC: 3.7 G/DL (ref 3.5–5)
ALP BLD-CCNC: 87 IU/L (ref 39–118)
ALT SERPL-CCNC: 10 IU/L (ref 10–40)
ANION GAP SERPL CALCULATED.3IONS-SCNC: 6 MMOL/L (ref 4–12)
AST SERPL-CCNC: 16 IU/L (ref 15–41)
BILIRUB SERPL-MCNC: 0.5 MG/DL (ref 0.2–1)
BUN BLDV-MCNC: 14 MG/DL (ref 7–20)
CALCIUM SERPL-MCNC: 8.7 MG/DL (ref 8.8–10.5)
CHLORIDE BLD-SCNC: 107 MEQ/L (ref 101–111)
CHOLESTEROL/HDL RELATIVE RISK: 2.7 (ref 4–4.4)
CHOLESTEROL: 183 MG/DL
CO2: 27 MEQ/L (ref 21–32)
CREAT SERPL-MCNC: 0.91 MG/DL (ref 0.6–1.3)
CREATININE CLEARANCE: >60
DIRECT-LDL / HDL RISK: 1.5
GLUCOSE: 79 MG/DL (ref 70–110)
HDLC SERPL-MCNC: 67 MG/DL
LDL CHOLESTEROL DIRECT: 102 MG/DL
POTASSIUM SERPL-SCNC: 3.8 MEQ/L (ref 3.6–5)
SODIUM BLD-SCNC: 140 MEQ/L (ref 135–145)
TOTAL PROTEIN: 6.8 G/DL (ref 6.2–8)
TRIGL SERPL-MCNC: 82 MG/DL
TSH SERPL DL<=0.05 MIU/L-ACNC: 3 MCIU/ML (ref 0.49–4.67)
VLDLC SERPL CALC-MCNC: 16 MG/DL

## 2022-06-23 ENCOUNTER — TELEPHONE (OUTPATIENT)
Dept: FAMILY MEDICINE CLINIC | Age: 62
End: 2022-06-23

## 2022-06-23 DIAGNOSIS — E03.9 ACQUIRED HYPOTHYROIDISM: ICD-10-CM

## 2022-06-23 NOTE — TELEPHONE ENCOUNTER
----- Message from Tu Vazquez MD sent at 6/22/2022  5:03 PM EDT -----  Let her know the labs look well and no dose changes are needed. Where would she want the synthroid RF sent?

## 2022-06-24 DIAGNOSIS — E03.9 ACQUIRED HYPOTHYROIDISM: ICD-10-CM

## 2022-06-24 RX ORDER — LEVOTHYROXINE SODIUM 100 MCG
TABLET ORAL
Qty: 90 TABLET | Refills: 3 | Status: SHIPPED | OUTPATIENT
Start: 2022-06-24 | End: 2022-07-05 | Stop reason: SDUPTHER

## 2022-06-24 RX ORDER — LEVOTHYROXINE SODIUM 100 MCG
TABLET ORAL
Qty: 90 TABLET | Status: CANCELLED | OUTPATIENT
Start: 2022-06-24

## 2022-07-05 ENCOUNTER — OFFICE VISIT (OUTPATIENT)
Dept: FAMILY MEDICINE CLINIC | Age: 62
End: 2022-07-05

## 2022-07-05 VITALS
HEART RATE: 70 BPM | SYSTOLIC BLOOD PRESSURE: 136 MMHG | WEIGHT: 155.38 LBS | RESPIRATION RATE: 14 BRPM | HEIGHT: 65 IN | BODY MASS INDEX: 25.89 KG/M2 | DIASTOLIC BLOOD PRESSURE: 80 MMHG

## 2022-07-05 DIAGNOSIS — Z00.00 WELL ADULT EXAM: Primary | ICD-10-CM

## 2022-07-05 DIAGNOSIS — I10 ESSENTIAL HYPERTENSION: ICD-10-CM

## 2022-07-05 DIAGNOSIS — E03.9 ACQUIRED HYPOTHYROIDISM: ICD-10-CM

## 2022-07-05 PROCEDURE — 99396 PREV VISIT EST AGE 40-64: CPT | Performed by: FAMILY MEDICINE

## 2022-07-05 RX ORDER — LEVOTHYROXINE SODIUM 100 MCG
TABLET ORAL
Qty: 90 TABLET | Refills: 3 | Status: SHIPPED | OUTPATIENT
Start: 2022-07-05

## 2022-07-05 SDOH — ECONOMIC STABILITY: FOOD INSECURITY: WITHIN THE PAST 12 MONTHS, THE FOOD YOU BOUGHT JUST DIDN'T LAST AND YOU DIDN'T HAVE MONEY TO GET MORE.: NEVER TRUE

## 2022-07-05 SDOH — ECONOMIC STABILITY: FOOD INSECURITY: WITHIN THE PAST 12 MONTHS, YOU WORRIED THAT YOUR FOOD WOULD RUN OUT BEFORE YOU GOT MONEY TO BUY MORE.: NEVER TRUE

## 2022-07-05 ASSESSMENT — PATIENT HEALTH QUESTIONNAIRE - PHQ9
2. FEELING DOWN, DEPRESSED OR HOPELESS: 0
SUM OF ALL RESPONSES TO PHQ QUESTIONS 1-9: 0
SUM OF ALL RESPONSES TO PHQ QUESTIONS 1-9: 0
1. LITTLE INTEREST OR PLEASURE IN DOING THINGS: 0
SUM OF ALL RESPONSES TO PHQ9 QUESTIONS 1 & 2: 0
SUM OF ALL RESPONSES TO PHQ QUESTIONS 1-9: 0
SUM OF ALL RESPONSES TO PHQ QUESTIONS 1-9: 0

## 2022-07-05 ASSESSMENT — SOCIAL DETERMINANTS OF HEALTH (SDOH): HOW HARD IS IT FOR YOU TO PAY FOR THE VERY BASICS LIKE FOOD, HOUSING, MEDICAL CARE, AND HEATING?: NOT HARD AT ALL

## 2022-07-05 ASSESSMENT — ENCOUNTER SYMPTOMS
CONSTIPATION: 0
SINUS PRESSURE: 0
SHORTNESS OF BREATH: 0

## 2022-07-05 NOTE — PATIENT INSTRUCTIONS
Check with the insurance to see if there would be a charge to you for the mammogram.  Let us know if we can help arrange the mammogram  Check on the colonoscopy or cologuard with your insurance  Keep working at the diet and weight loss  See me in 6 months

## 2022-07-05 NOTE — PROGRESS NOTES
Lou Restrepo (:  1960) is a 59 y.o. female,Established patient, here for evaluation of the following chief complaint(s):  Hypothyroidism         ASSESSMENT/PLAN:   Diagnosis Orders   1. Well adult exam     2. Acquired hypothyroidism  SYNTHROID 100 MCG tablet   3. Essential hypertension              Check with the insurance to see if there would be a charge to you for the mammogram.  Let us know if we can help arrange the mammogram  Check on the colonoscopy or cologuard with your insurance  Keep working at the diet and weight loss  See me in 6 months    Subjective   SUBJECTIVE/OBJECTIVE:  HPI  Well Adult Physical   Patient here for a comprehensive physical exam.The patient reports problems - she has some occasional soreness to the upper abd that will last just a moment. She has some left ankle soreness that the podiatrist feels may need surgery. Do you take any herbs or supplements that were not prescribed by a doctor? yes Are you taking calcium supplements? We reviewed the need to increase her intake. Are you taking aspirin daily? no   History:  She sees the GYN    Review of Systems   Constitutional: Negative for fatigue. HENT: Negative for sinus pressure. Eyes: Negative for visual disturbance. Respiratory: Negative for shortness of breath. Cardiovascular: Negative for chest pain. Gastrointestinal: Negative for constipation. Genitourinary: Negative. Musculoskeletal: Negative for arthralgias. Skin: Negative for rash. Neurological: Negative for headaches. The patient's medications, allergies, past medical problems, surgical, social, and family histories were reviewed and updated as needed. Objective   Physical Exam  Constitutional:       General: She is not in acute distress. Appearance: She is well-developed. HENT:      Head: Normocephalic and atraumatic.       Right Ear: External ear normal.      Left Ear: External ear normal.      Nose: Nose normal. Mouth/Throat:      Pharynx: No oropharyngeal exudate. Eyes:      General: No scleral icterus. Conjunctiva/sclera: Conjunctivae normal.   Neck:      Thyroid: No thyromegaly. Vascular: No carotid bruit. Trachea: No tracheal deviation. Cardiovascular:      Rate and Rhythm: Normal rate and regular rhythm. Heart sounds: Normal heart sounds. Pulmonary:      Effort: Pulmonary effort is normal.      Breath sounds: Normal breath sounds. Abdominal:      General: Bowel sounds are normal.      Palpations: Abdomen is soft. There is no mass. Musculoskeletal:      Cervical back: Neck supple. Lymphadenopathy:      Cervical: No cervical adenopathy. Skin:     General: Skin is warm and dry. Neurological:      Mental Status: She is alert and oriented to person, place, and time. Psychiatric:         Behavior: Behavior normal.     Blood pressure 136/80, pulse 70, resp. rate 14, height 5' 5\" (1.651 m), weight 155 lb 6 oz (70.5 kg), not currently breastfeeding. An electronic signature was used to authenticate this note.     --Catia Yancey MD

## 2022-07-13 RX ORDER — ATENOLOL 50 MG/1
TABLET ORAL
Qty: 90 TABLET | Refills: 3 | Status: SHIPPED | OUTPATIENT
Start: 2022-07-13

## 2022-07-21 RX ORDER — POTASSIUM CHLORIDE 20 MEQ/1
TABLET, EXTENDED RELEASE ORAL
Qty: 90 TABLET | Refills: 1 | Status: SHIPPED | OUTPATIENT
Start: 2022-07-21 | End: 2022-09-23

## 2022-07-21 NOTE — TELEPHONE ENCOUNTER
The pharmacy is  requesting a refill of the below medication which has been pended for you:     Requested Prescriptions     Pending Prescriptions Disp Refills    potassium chloride (KLOR-CON M) 20 MEQ extended release tablet [Pharmacy Med Name: Potassium Chloride Carol ER 20 MEQ Oral Tablet Extended Release] 90 tablet 0     Sig: TAKE 1 TABLET BY MOUTH THREE TIMES DAILY       Last Appointment Date: 7/5/2022  Next Appointment Date: 1/10/2023    No Known Allergies

## 2022-09-23 RX ORDER — POTASSIUM CHLORIDE 20 MEQ/1
TABLET, EXTENDED RELEASE ORAL
Qty: 90 TABLET | Refills: 3 | Status: SHIPPED | OUTPATIENT
Start: 2022-09-23

## 2022-09-23 NOTE — TELEPHONE ENCOUNTER
Date of last visit:  7/5/2022  Date of next visit:  1/10/2023    Requested Prescriptions     Pending Prescriptions Disp Refills    potassium chloride (KLOR-CON M) 20 MEQ extended release tablet [Pharmacy Med Name: Potassium Chloride Carol ER 20 MEQ Oral Tablet Extended Release] 90 tablet 0     Sig: TAKE 1 TABLET BY MOUTH THREE TIMES DAILY

## 2023-01-10 ENCOUNTER — OFFICE VISIT (OUTPATIENT)
Dept: FAMILY MEDICINE CLINIC | Age: 63
End: 2023-01-10

## 2023-01-10 VITALS
HEART RATE: 72 BPM | WEIGHT: 159 LBS | SYSTOLIC BLOOD PRESSURE: 128 MMHG | DIASTOLIC BLOOD PRESSURE: 76 MMHG | BODY MASS INDEX: 26.49 KG/M2 | HEIGHT: 65 IN | RESPIRATION RATE: 14 BRPM

## 2023-01-10 DIAGNOSIS — Z12.31 VISIT FOR SCREENING MAMMOGRAM: ICD-10-CM

## 2023-01-10 DIAGNOSIS — I10 ESSENTIAL HYPERTENSION: Primary | ICD-10-CM

## 2023-01-10 ASSESSMENT — ENCOUNTER SYMPTOMS
CONSTIPATION: 0
SINUS PRESSURE: 0
SHORTNESS OF BREATH: 0

## 2023-01-10 ASSESSMENT — PATIENT HEALTH QUESTIONNAIRE - PHQ9
SUM OF ALL RESPONSES TO PHQ QUESTIONS 1-9: 0
1. LITTLE INTEREST OR PLEASURE IN DOING THINGS: 0
SUM OF ALL RESPONSES TO PHQ QUESTIONS 1-9: 0
2. FEELING DOWN, DEPRESSED OR HOPELESS: 0
SUM OF ALL RESPONSES TO PHQ9 QUESTIONS 1 & 2: 0

## 2023-01-10 NOTE — PROGRESS NOTES
Radha Caballero (:  1960) is a 57 y.o. female,Established patient, here for evaluation of the following chief complaint(s):  No chief complaint on file. ASSESSMENT/PLAN:   Diagnosis Orders   1. Essential hypertension        2. Visit for screening mammogram  WILBER DIGITAL SCREEN W OR WO CAD BILATERAL             See me in 6 months    Subjective   SUBJECTIVE/OBJECTIVE:  HPI  She fell 2022 on her porch injuring the right side of the chest but it is better now  She will be having surgery on the ankle soon  Review of Systems   Constitutional:  Negative for fatigue. HENT:  Negative for sinus pressure. Eyes:  Negative for visual disturbance. Respiratory:  Negative for shortness of breath. Cardiovascular:  Negative for chest pain. Gastrointestinal:  Negative for constipation. Genitourinary: Negative. Musculoskeletal:  Positive for arthralgias, gait problem and myalgias. Skin:  Negative for rash. Neurological:  Negative for headaches. The patient's medications, allergies, past medical problems, surgical, social, and family histories were reviewed and updated as needed. Objective   Physical Exam  Constitutional:       Appearance: Normal appearance. She is well-developed. HENT:      Head: Normocephalic and atraumatic. Eyes:      General: No scleral icterus. Conjunctiva/sclera: Conjunctivae normal.   Neck:      Trachea: No tracheal deviation. Cardiovascular:      Rate and Rhythm: Normal rate and regular rhythm. Heart sounds: Normal heart sounds. Pulmonary:      Effort: Pulmonary effort is normal.      Breath sounds: Normal breath sounds. Skin:     General: Skin is warm and dry. Neurological:      General: No focal deficit present. Mental Status: She is alert. Psychiatric:         Behavior: Behavior normal.    Blood pressure 128/76, pulse 72, resp. rate 14, height 5' 5\" (1.651 m), weight 159 lb (72.1 kg), not currently breastfeeding.             An electronic signature was used to authenticate this note.     --Fanny Dickson MD

## 2023-01-12 ENCOUNTER — TELEPHONE (OUTPATIENT)
Dept: FAMILY MEDICINE CLINIC | Age: 63
End: 2023-01-12

## 2023-01-12 NOTE — TELEPHONE ENCOUNTER
Mammogram scheduled for 1/24/2023 at 720am at [facility]. Patient needs to arrive by 710am at [facility].    No darryn,lotion or perfume/  MOM to return call to office

## 2023-01-13 NOTE — TELEPHONE ENCOUNTER
Tried calling pt. Left message on answering machine requesting pt to call back at earliest convenience.

## 2023-01-17 DIAGNOSIS — N26.1 RENAL ATROPHY: Primary | ICD-10-CM

## 2023-01-17 DIAGNOSIS — I10 ESSENTIAL HYPERTENSION: Primary | ICD-10-CM

## 2023-01-17 NOTE — TELEPHONE ENCOUNTER
Ellen Rawls informed by Phone. She is unsure if she can make that appt or not. I gave her the # to call and Reschedule if she can not.

## 2023-01-25 ENCOUNTER — HOSPITAL ENCOUNTER (OUTPATIENT)
Dept: WOMENS IMAGING | Age: 63
Discharge: HOME | End: 2023-01-25
Payer: COMMERCIAL

## 2023-01-25 DIAGNOSIS — Z12.31 VISIT FOR SCREENING MAMMOGRAM: ICD-10-CM

## 2023-01-25 PROCEDURE — 77063 BREAST TOMOSYNTHESIS BI: CPT

## 2023-02-07 RX ORDER — POTASSIUM CHLORIDE 20 MEQ/1
TABLET, EXTENDED RELEASE ORAL
Qty: 270 TABLET | Refills: 3 | Status: SHIPPED | OUTPATIENT
Start: 2023-02-07

## 2023-02-07 RX ORDER — POTASSIUM CHLORIDE 20 MEQ/1
TABLET, EXTENDED RELEASE ORAL
Qty: 90 TABLET | Refills: 3 | OUTPATIENT
Start: 2023-02-07

## 2023-02-07 NOTE — TELEPHONE ENCOUNTER
Date of last visit:  1/10/2023  Date of next visit:  7/11/2023    Requested Prescriptions     Pending Prescriptions Disp Refills    potassium chloride (KLOR-CON M) 20 MEQ extended release tablet [Pharmacy Med Name: Potassium Chloride Carol ER 20 MEQ Oral Tablet Extended Release] 90 tablet 0     Sig: TAKE 1  BY MOUTH THREE TIMES DAILY

## 2023-02-27 RX ORDER — FUROSEMIDE 40 MG/1
TABLET ORAL
Qty: 90 TABLET | Refills: 3 | Status: SHIPPED | OUTPATIENT
Start: 2023-02-27

## 2023-03-07 ENCOUNTER — OFFICE VISIT (OUTPATIENT)
Dept: NEPHROLOGY | Age: 63
End: 2023-03-07
Payer: COMMERCIAL

## 2023-03-07 VITALS
DIASTOLIC BLOOD PRESSURE: 86 MMHG | BODY MASS INDEX: 26.29 KG/M2 | SYSTOLIC BLOOD PRESSURE: 154 MMHG | WEIGHT: 158 LBS | HEART RATE: 74 BPM | OXYGEN SATURATION: 98 %

## 2023-03-07 DIAGNOSIS — I10 PRIMARY HYPERTENSION: Primary | ICD-10-CM

## 2023-03-07 PROCEDURE — 3079F DIAST BP 80-89 MM HG: CPT | Performed by: INTERNAL MEDICINE

## 2023-03-07 PROCEDURE — 99213 OFFICE O/P EST LOW 20 MIN: CPT | Performed by: INTERNAL MEDICINE

## 2023-03-07 PROCEDURE — 3077F SYST BP >= 140 MM HG: CPT | Performed by: INTERNAL MEDICINE

## 2023-03-07 NOTE — PROGRESS NOTES
[facility]    [facility], [address] [place] [address],[address]   Neil Otero, Skylar Tai  [phone]  Progress Note  3/7/2023 2:45 PM        Pt Name:    Angela Church  YOB: 1960  Primary Care Physician:  Gray Grace MD     Chief Complaint:   Chief Complaint   Patient presents with    Follow-up     Hypokalemia, HTN        Background Information/Interval History:   The patient is a 57year old AA female who was following Dr. Marlo Peralta previously. She has hx hypertension for 15+ years, hx CKD II,  vitamin D deficiency and hypothyroidism. She is here for follow-up. She has no complaints. She reports she is going to be getting ankle surgery sometime in next few months. She says BP at home is usually in 130-140 range. Past History:  Past Medical History:   Diagnosis Date    Chronic kidney disease     Hypertension 2001    Hyperthyroidism 1989    Vitamin D deficiency      Past Surgical History:   Procedure Laterality Date    DILATION AND CURETTAGE OF UTERUS N/A 6/18/2020    DILATATION AND CURETTAGE HYSTEROSCOPY performed by Israel Olivarez MD at [place] [address]  11/18/2016    Dr Suhas Anderson:  BP (!) 154/86 (Site: Right Upper Arm, Position: Sitting, Cuff Size: Medium Adult)   Pulse 74   Wt 158 lb (71.7 kg)   SpO2 98%   BMI 26.29 kg/m²   Wt Readings from Last 3 Encounters:   03/07/23 158 lb (71.7 kg)   01/10/23 159 lb (72.1 kg)   07/05/22 155 lb 6 oz (70.5 kg)     Body mass index is 26.29 kg/m².      General Appearance: alert and cooperative with exam, appears comfortable, no distress  Oral: moist oral mucus membranes  Neck: No jugular venous distention  Lungs: Air entry B/L, no crackles or rales, no use of accessory muscles  Heart: S1, S2 heard  Extremities: No sig LE edema     Medications:  Current Outpatient Medications   Medication Sig Dispense Refill    furosemide (LASIX) 40 MG tablet Take 1 tablet by mouth once daily 90 tablet 3 potassium chloride (KLOR-CON M) 20 MEQ extended release tablet TAKE 1  BY MOUTH THREE TIMES DAILY 270 tablet 3    atenolol (TENORMIN) 50 MG tablet Take 1 tablet by mouth once daily 90 tablet 3    SYNTHROID 100 MCG tablet TAKE 1 TABLET BY MOUTH ONCE DAILY 90 tablet 3    NIFEdipine (ADALAT CC) 90 MG extended release tablet Take 1 tablet by mouth once daily 90 tablet 3    Ferrous Gluconate (IRON 27 PO) Take 27 mg by mouth daily      Doxylamine Succinate, Sleep, (SLEEP AID PO) Take 25 mg by mouth      Ascorbic Acid (VITAMIN C) 250 MG tablet Take 250 mg by mouth daily      GARLIC PO Take 1 tablet by mouth daily      vitamin D (CHOLECALCIFEROL) 1000 UNIT TABS tablet Take 1,000 Units by mouth daily. No current facility-administered medications for this visit. Laboratory & Diagnostics:  Radiology/US kidneys:April 2014: L 10.2 cm, R 8.4 cm, No LINDA  Aug 2015: BMP: K 3.5, Creat 1.1, Hb 14.3, UA: no blood, 100 mg/dL protein, UPCR 100 mg/g  Feb 2016: , K 3.7, Creat 0.81  March 2017: Creat 1.09, K 3.6  April 2017: Aldosterone 15, renin less than 0.6     May 2018: K 3.6, Creat 1.4  May 2019: K 3.3, Creat 1.07  Dec 2019: UPCR 300 mg/g  Sept 2020: K 3.8, Creat 0.93  Jan 2022: K 3.4, Creat 0.83, Sodium 140, UPCR 80 mg/g  Feb 2023: K 3.9, creat 0.75, UPCR 70 mg/g  Jan 2023: US okay     Impression/Plan:   1. Hypokalemia: stable. On replacement. 2. Hypertension: She is on nifedipine and atenolol. Stable. Her US in Jan 2023 shows 10 and 10.1 cm kidneys. Advised low salt diet. BP is in 150 today in office. She says it is better at home. Bring some BP readings next visit. D/W patient in detail. 3. Peripheral edema: on lasix 40 mg daily    Orders Placed This Encounter   Procedures    Basic Metabolic Panel     Return in about 1 year (around 3/7/2024).     Deepa Gunter MD  [facility]

## 2023-03-16 NOTE — TELEPHONE ENCOUNTER
MOM to call Renu Burns is a 26 y.o. female 18w3d        OB History    Para Term  AB Living   3 1 1 0 1 1   SAB IAB Ectopic Molar Multiple Live Births   1 0 0 0 0 1      # Outcome Date GA Lbr Jesus Manuel/2nd Weight Sex Delivery Anes PTL Lv   3 Current            2 SAB 2020           1 Term 14 39w1d  5 lb 10 oz (2.551 kg) M CS-LTranv  N RADHA      Complications: Failure to Progress in First Stage, Fetal Intolerance       Chief Complaint   Patient presents with    High Risk Pregnancy            Blood pressure 100/70, pulse 75, weight 149 lb (67.6 kg), last menstrual period 2022, not currently breastfeeding. The patient was seen and evaluated. There was N/A fetal movements. No contractions or leakage of fluid. Signs and symptoms of  labor as well as labor were reviewed. Dates were reviewed with the patient. The patient will return to the office for her next visit in 4 weeks. See antepartum flow sheet. Patient Active Problem List    Diagnosis Date Noted    Encounter for supervision of high risk pregnancy in first trimester, antepartum 2023     Priority: Medium    High risk human papilloma virus (HPV) infection of cervix 2022     Priority: Medium    Retroversion, uterus 2022     Priority: Medium    Complex partial epileptic seizure ([facility] [address] 2014     Priority: Medium    Generalized convulsive epilepsy ([facility] [address] 2013     Priority: Medium    ASCUS with positive high risk HPV cervical 2020    Incomplete miscarriage 2020    LGSIL on Pap smear of cervix 2016        Diagnosis Orders   1. HRP (high risk pregnancy), second trimester        2. 18 weeks gestation of pregnancy        3. Seizure disorder during pregnancy, antepartum [facility])          Patient doing well. Fetal heart rate auscultated at 137 bpm and fundal height is 19 cm. today. Patient advised to continue taking prenatal vitamins and to rest as necessary.      sanchez Castillo for, and in the presence of Dr. Robinson Turcios. Electronically signed by: Elvera Brunner 3/16/23 3:29 PM   I agree to the above documentation placed by my scribe Elvera Brunner. I reviewed the scribe's note and agree with the documented findings and plan of care. Any areas of disagreement are noted on the chart. I have personally evaluated this patient. Additional findings are as noted. I agree with the chief complaint, past medical history, past surgical history, allergies, medications, social and family history as documented unless otherwise noted below.      Electronically signed by Robinson Turcios MD on 3/16/2023 at 5:02 PM

## 2023-04-18 ENCOUNTER — NURSE ONLY (OUTPATIENT)
Dept: LAB | Age: 63
End: 2023-04-18

## 2023-04-30 LAB — CYTOLOGY THIN PREP PAP: NORMAL

## 2023-05-10 ENCOUNTER — HOSPITAL ENCOUNTER (OUTPATIENT)
Dept: CT IMAGING | Age: 63
Discharge: HOME | End: 2023-05-10
Payer: COMMERCIAL

## 2023-05-10 DIAGNOSIS — S92.252K: ICD-10-CM

## 2023-05-10 PROCEDURE — 73700 CT LOWER EXTREMITY W/O DYE: CPT

## 2023-05-31 DIAGNOSIS — I10 ESSENTIAL HYPERTENSION: ICD-10-CM

## 2023-05-31 RX ORDER — NIFEDIPINE 90 MG/1
TABLET, FILM COATED, EXTENDED RELEASE ORAL
Qty: 90 TABLET | Refills: 2 | Status: SHIPPED | OUTPATIENT
Start: 2023-05-31

## 2023-07-02 DIAGNOSIS — E03.9 ACQUIRED HYPOTHYROIDISM: ICD-10-CM

## 2023-07-03 DIAGNOSIS — I10 ESSENTIAL HYPERTENSION: ICD-10-CM

## 2023-07-03 DIAGNOSIS — E03.9 ACQUIRED HYPOTHYROIDISM: Primary | ICD-10-CM

## 2023-07-03 RX ORDER — ATENOLOL 50 MG/1
TABLET ORAL
Qty: 90 TABLET | Refills: 3 | Status: SHIPPED | OUTPATIENT
Start: 2023-07-03

## 2023-07-10 RX ORDER — LEVOTHYROXINE SODIUM 100 MCG
TABLET ORAL
Qty: 90 TABLET | Refills: 0 | OUTPATIENT
Start: 2023-07-10

## 2023-07-10 NOTE — TELEPHONE ENCOUNTER
Daughter Enriqueta Morales informed. She will have her mom come in tomorrow to  lab orders. Also appt was rescheduled.

## 2023-07-10 NOTE — TELEPHONE ENCOUNTER
She's due for fasting labs now. Will she need a small number of thyroid meds to last till the result is back.

## 2023-07-13 NOTE — TELEPHONE ENCOUNTER
Please note that the daughter was already informed and they are picking up labs. Will wait on test results for refill.

## 2023-07-13 NOTE — TELEPHONE ENCOUNTER
Riley Laura called back stating pt will call us if she needs a short refill of Synthroid. Req labs faxed to [facility].     Lab order faxed

## 2023-07-25 DIAGNOSIS — E03.9 ACQUIRED HYPOTHYROIDISM: ICD-10-CM

## 2023-07-26 NOTE — TELEPHONE ENCOUNTER
MOM for pt to CB. Per Dr Arreaga Keep pt needs to get her TSH done before refill. Does she need a small supply until blood work is drawn?

## 2023-07-27 RX ORDER — LEVOTHYROXINE SODIUM 100 MCG
TABLET ORAL
Qty: 7 TABLET | Refills: 0 | Status: SHIPPED | OUTPATIENT
Start: 2023-07-27

## 2023-07-27 NOTE — TELEPHONE ENCOUNTER
Pt called back and she will go tomorrow and get blood work done after work. pt said she will need some pills to get her through the weekend.       [facility] [address]      Orders faxed over to [facility] OP lab

## 2023-08-01 ENCOUNTER — OFFICE VISIT (OUTPATIENT)
Dept: FAMILY MEDICINE CLINIC | Age: 63
End: 2023-08-01

## 2023-08-01 VITALS
HEART RATE: 80 BPM | RESPIRATION RATE: 14 BRPM | HEIGHT: 65 IN | DIASTOLIC BLOOD PRESSURE: 60 MMHG | BODY MASS INDEX: 26.33 KG/M2 | WEIGHT: 158 LBS | SYSTOLIC BLOOD PRESSURE: 138 MMHG

## 2023-08-01 DIAGNOSIS — E03.9 ACQUIRED HYPOTHYROIDISM: ICD-10-CM

## 2023-08-01 DIAGNOSIS — I10 ESSENTIAL HYPERTENSION: ICD-10-CM

## 2023-08-01 DIAGNOSIS — Z00.00 WELL ADULT EXAM: Primary | ICD-10-CM

## 2023-08-01 LAB
A/G RATIO: 1.1 (ref 1.5–2.5)
ALBUMIN SERPL-MCNC: 3.8 G/DL (ref 3.5–5)
ALP BLD-CCNC: 85 IU/L (ref 39–118)
ALT SERPL-CCNC: 11 IU/L (ref 10–40)
ANION GAP SERPL CALCULATED.3IONS-SCNC: 7 MMOL/L (ref 4–12)
AST SERPL-CCNC: 16 IU/L (ref 15–41)
BILIRUB SERPL-MCNC: 0.5 MG/DL (ref 0.2–1)
BUN BLDV-MCNC: 13 MG/DL (ref 7–20)
CALCIUM SERPL-MCNC: 8.6 MG/DL (ref 8.8–10.5)
CHLORIDE BLD-SCNC: 103 MEQ/L (ref 101–111)
CHOLESTEROL/HDL RELATIVE RISK: 2.8 (ref 4–4.4)
CHOLESTEROL: 173 MG/DL
CO2: 28 MEQ/L (ref 21–32)
CREAT SERPL-MCNC: 0.84 MG/DL (ref 0.6–1.3)
CREATININE CLEARANCE: >60
DIRECT-LDL / HDL RISK: 1.7
GLUCOSE: 89 MG/DL (ref 70–110)
HDLC SERPL-MCNC: 60 MG/DL
LDL CHOLESTEROL DIRECT: 106 MG/DL
POTASSIUM SERPL-SCNC: 3.8 MEQ/L (ref 3.6–5)
SODIUM BLD-SCNC: 138 MEQ/L (ref 135–145)
TOTAL PROTEIN: 7.2 G/DL (ref 6.2–8)
TRIGL SERPL-MCNC: 111 MG/DL
TSH SERPL DL<=0.05 MIU/L-ACNC: 1.02 MCIU/ML (ref 0.49–4.67)
VLDLC SERPL CALC-MCNC: 22 MG/DL

## 2023-08-01 PROCEDURE — 99396 PREV VISIT EST AGE 40-64: CPT | Performed by: FAMILY MEDICINE

## 2023-08-01 RX ORDER — LEVOTHYROXINE SODIUM 100 MCG
100 TABLET ORAL DAILY
Qty: 90 TABLET | Refills: 3 | Status: SHIPPED | OUTPATIENT
Start: 2023-08-01

## 2023-08-01 SDOH — ECONOMIC STABILITY: FOOD INSECURITY: WITHIN THE PAST 12 MONTHS, YOU WORRIED THAT YOUR FOOD WOULD RUN OUT BEFORE YOU GOT MONEY TO BUY MORE.: NEVER TRUE

## 2023-08-01 SDOH — ECONOMIC STABILITY: FOOD INSECURITY: WITHIN THE PAST 12 MONTHS, THE FOOD YOU BOUGHT JUST DIDN'T LAST AND YOU DIDN'T HAVE MONEY TO GET MORE.: NEVER TRUE

## 2023-08-01 SDOH — ECONOMIC STABILITY: INCOME INSECURITY: HOW HARD IS IT FOR YOU TO PAY FOR THE VERY BASICS LIKE FOOD, HOUSING, MEDICAL CARE, AND HEATING?: NOT HARD AT ALL

## 2023-08-01 SDOH — ECONOMIC STABILITY: HOUSING INSECURITY
IN THE LAST 12 MONTHS, WAS THERE A TIME WHEN YOU DID NOT HAVE A STEADY PLACE TO SLEEP OR SLEPT IN A SHELTER (INCLUDING NOW)?: NO

## 2023-08-01 ASSESSMENT — ENCOUNTER SYMPTOMS
CONSTIPATION: 0
SHORTNESS OF BREATH: 0
SINUS PRESSURE: 0

## 2023-08-01 NOTE — PROGRESS NOTES
rhythm. Heart sounds: Normal heart sounds. Pulmonary:      Effort: Pulmonary effort is normal.      Breath sounds: Normal breath sounds. Abdominal:      General: Bowel sounds are normal.      Palpations: Abdomen is soft. There is no mass. Musculoskeletal:      Cervical back: Neck supple. Lymphadenopathy:      Cervical: No cervical adenopathy. Skin:     General: Skin is warm and dry. Neurological:      Mental Status: She is alert and oriented to person, place, and time. Psychiatric:         Behavior: Behavior normal.    Blood pressure 138/60, pulse 80, resp. rate 14, height 5' 5\" (1.651 m), weight 158 lb (71.7 kg), not currently breastfeeding. An electronic signature was used to authenticate this note.     --Jennie Foster MD

## 2023-10-17 ENCOUNTER — NURSE ONLY (OUTPATIENT)
Dept: FAMILY MEDICINE CLINIC | Age: 63
End: 2023-10-17

## 2023-10-17 DIAGNOSIS — Z23 NEED FOR INFLUENZA VACCINATION: Primary | ICD-10-CM

## 2023-10-17 PROCEDURE — 90471 IMMUNIZATION ADMIN: CPT | Performed by: FAMILY MEDICINE

## 2023-10-17 PROCEDURE — 90688 IIV4 VACCINE SPLT 0.5 ML IM: CPT | Performed by: FAMILY MEDICINE

## 2023-11-06 ENCOUNTER — TELEPHONE (OUTPATIENT)
Dept: FAMILY MEDICINE CLINIC | Age: 63
End: 2023-11-06

## 2023-11-06 NOTE — TELEPHONE ENCOUNTER
MOM to return call to office need to schedule preop and then call back to [facility] office.

## 2023-11-06 NOTE — TELEPHONE ENCOUNTER
Abbie Mello with Dr. Janina Malhotra office called for pre-op clearance. Patient will be having [facility] Foot [facility] Dr. Nina Wiley to order pre-op testing. Pt would like an appointment on the following days: Tuesday and Wednesdays between 8-4. Please called patient to schedule and then call Abbie Mello with appointment date and time if willing to clear.     Abbie Mello may be reached at [phone]

## 2023-11-29 ENCOUNTER — HOSPITAL ENCOUNTER (OUTPATIENT)
Age: 63
Discharge: HOME | End: 2023-11-29
Payer: COMMERCIAL

## 2023-11-29 ENCOUNTER — HOSPITAL ENCOUNTER (OUTPATIENT)
Dept: GENERAL RADIOLOGY | Age: 63
Discharge: HOME | End: 2023-11-29
Payer: COMMERCIAL

## 2023-11-29 DIAGNOSIS — Z01.818 PREOP EXAMINATION: ICD-10-CM

## 2023-11-29 LAB
ALBUMIN SERPL BCG-MCNC: 3.9 G/DL (ref 3.5–5.1)
ALP SERPL-CCNC: 96 U/L (ref 38–126)
ALT SERPL W/O P-5'-P-CCNC: 16 U/L (ref 11–66)
ANION GAP SERPL CALC-SCNC: 13 MEQ/L (ref 8–16)
AST SERPL-CCNC: 20 U/L (ref 5–40)
BILIRUB SERPL-MCNC: 0.3 MG/DL (ref 0.3–1.2)
BUN SERPL-MCNC: 12 MG/DL (ref 7–22)
CALCIUM SERPL-MCNC: 9.4 MG/DL (ref 8.5–10.5)
CHLORIDE SERPL-SCNC: 103 MEQ/L (ref 98–111)
CO2 SERPL-SCNC: 25 MEQ/L (ref 23–33)
CREAT SERPL-MCNC: 0.9 MG/DL (ref 0.4–1.2)
DEPRECATED RDW RBC AUTO: 47.6 FL (ref 35–45)
EKG ATRIAL RATE: 75 BPM
EKG P AXIS: 58 DEGREES
EKG P-R INTERVAL: 150 MS
EKG Q-T INTERVAL: 408 MS
EKG QRS DURATION: 92 MS
EKG QTC CALCULATION (BAZETT): 455 MS
EKG R AXIS: 54 DEGREES
EKG T AXIS: 41 DEGREES
EKG VENTRICULAR RATE: 75 BPM
ERYTHROCYTE [DISTWIDTH] IN BLOOD BY AUTOMATED COUNT: 14.3 % (ref 11.5–14.5)
GFR SERPL CREATININE-BSD FRML MDRD: > 60 ML/MIN/1.73M2
GLUCOSE SERPL-MCNC: 112 MG/DL (ref 70–108)
HCT VFR BLD AUTO: 46 % (ref 37–47)
HGB BLD-MCNC: 14.7 GM/DL (ref 12–16)
MCH RBC QN AUTO: 28.9 PG (ref 26–33)
MCHC RBC AUTO-ENTMCNC: 32 GM/DL (ref 32.2–35.5)
MCV RBC AUTO: 90.6 FL (ref 81–99)
PLATELET # BLD AUTO: 206 THOU/MM3 (ref 130–400)
PMV BLD AUTO: 11.8 FL (ref 9.4–12.4)
POTASSIUM SERPL-SCNC: 3.3 MEQ/L (ref 3.5–5.2)
PROT SERPL-MCNC: 7.5 G/DL (ref 6.1–8)
RBC # BLD AUTO: 5.08 MILL/MM3 (ref 4.2–5.4)
SODIUM SERPL-SCNC: 141 MEQ/L (ref 135–145)
WBC # BLD AUTO: 8.7 THOU/MM3 (ref 4.8–10.8)

## 2023-11-29 PROCEDURE — 85027 COMPLETE CBC AUTOMATED: CPT

## 2023-11-29 PROCEDURE — 80053 COMPREHEN METABOLIC PANEL: CPT

## 2023-11-29 PROCEDURE — 93010 ELECTROCARDIOGRAM REPORT: CPT | Performed by: INTERNAL MEDICINE

## 2023-11-29 PROCEDURE — 71046 X-RAY EXAM CHEST 2 VIEWS: CPT

## 2023-11-29 PROCEDURE — 36415 COLL VENOUS BLD VENIPUNCTURE: CPT

## 2023-11-29 PROCEDURE — 93005 ELECTROCARDIOGRAM TRACING: CPT | Performed by: PODIATRIST

## 2023-12-12 ENCOUNTER — OFFICE VISIT (OUTPATIENT)
Dept: FAMILY MEDICINE CLINIC | Age: 63
End: 2023-12-12

## 2023-12-12 VITALS
BODY MASS INDEX: 26.89 KG/M2 | DIASTOLIC BLOOD PRESSURE: 78 MMHG | HEIGHT: 65 IN | SYSTOLIC BLOOD PRESSURE: 128 MMHG | RESPIRATION RATE: 18 BRPM | WEIGHT: 161.38 LBS | HEART RATE: 76 BPM

## 2023-12-12 DIAGNOSIS — N18.2 STAGE 2 CHRONIC KIDNEY DISEASE: ICD-10-CM

## 2023-12-12 DIAGNOSIS — I10 ESSENTIAL HYPERTENSION: Primary | ICD-10-CM

## 2023-12-12 DIAGNOSIS — G89.29 CHRONIC PAIN OF LEFT ANKLE: ICD-10-CM

## 2023-12-12 DIAGNOSIS — I10 PRIMARY HYPERTENSION: ICD-10-CM

## 2023-12-12 DIAGNOSIS — M25.572 CHRONIC PAIN OF LEFT ANKLE: ICD-10-CM

## 2023-12-12 DIAGNOSIS — E03.4 HYPOTHYROIDISM DUE TO ACQUIRED ATROPHY OF THYROID: ICD-10-CM

## 2023-12-12 PROCEDURE — 99214 OFFICE O/P EST MOD 30 MIN: CPT | Performed by: FAMILY MEDICINE

## 2023-12-12 PROCEDURE — 1123F ACP DISCUSS/DSCN MKR DOCD: CPT | Performed by: FAMILY MEDICINE

## 2023-12-12 ASSESSMENT — ENCOUNTER SYMPTOMS
EYE PAIN: 0
CONSTIPATION: 0
WHEEZING: 0
ABDOMINAL PAIN: 0
SHORTNESS OF BREATH: 0
COUGH: 0
SORE THROAT: 0
CHEST TIGHTNESS: 0
ORTHOPNEA: 0
NAUSEA: 0

## 2023-12-12 NOTE — PROGRESS NOTES
Subjective:      Patient ID: Deidra Nicole is a 70 y.o. female. For  talar   surgery   left  ankle  dr Iggy Nicholas        Surgery  23        Thyroid  stable       Hx  of  covid  pneumonia      more  right  middle  lobe           Hypertension  This is a chronic problem. The current episode started more than 1 year ago. The problem has been resolved since onset. The problem is controlled. Pertinent negatives include no chest pain, headaches, neck pain, orthopnea, palpitations, peripheral edema or shortness of breath. The current treatment provides significant improvement. There are no compliance problems.       .  Past Medical History:   Diagnosis Date    Chronic kidney disease     Hypertension     Hyperthyroidism     Vitamin D deficiency      Past Surgical History:   Procedure Laterality Date    DILATION AND CURETTAGE OF UTERUS N/A 2020    DILATATION AND CURETTAGE HYSTEROSCOPY performed by Fiordaliza Schmitz MD at [address] [place] [address] Place  2016    Dr Grisel Terrazas History     Socioeconomic History    Marital status: Single     Spouse name: Not on file    Number of children: Not on file    Years of education: Not on file    Highest education level: Not on file   Occupational History    Not on file   Tobacco Use    Smoking status: Former     Packs/day: 1.00     Years: 30.00     Additional pack years: 0.00     Total pack years: 30.00     Types: Cigarettes     Quit date: 2017     Years since quittin.8    Smokeless tobacco: Never   Vaping Use    Vaping Use: Never used   Substance and Sexual Activity    Alcohol use: No    Drug use: No    Sexual activity: Yes     Partners: Male   Other Topics Concern    Not on file   Social History Narrative    Not on file     Social Determinants of Health     Financial Resource Strain: Low Risk  (2023)    Overall Financial Resource Strain (CARDIA)     Difficulty of Paying Living Expenses: Not hard at all

## 2023-12-19 DIAGNOSIS — I10 ESSENTIAL HYPERTENSION: Primary | ICD-10-CM

## 2023-12-19 DIAGNOSIS — E87.6 HYPOKALEMIA: ICD-10-CM

## 2023-12-22 ENCOUNTER — HOSPITAL ENCOUNTER (INPATIENT)
Age: 63
LOS: 2 days | Discharge: HOME HEALTH | DRG: 469 | End: 2023-12-28
Attending: PODIATRIST | Admitting: PODIATRIST
Payer: COMMERCIAL

## 2023-12-22 DIAGNOSIS — G89.18 POST-OPERATIVE PAIN: Primary | ICD-10-CM

## 2023-12-22 PROCEDURE — 6370000000 HC RX 637 (ALT 250 FOR IP): Performed by: PHYSICIAN ASSISTANT

## 2023-12-22 PROCEDURE — 7100000010 HC PHASE II RECOVERY - FIRST 15 MIN: Performed by: PODIATRIST

## 2023-12-22 PROCEDURE — 2580000003 HC RX 258: Performed by: PODIATRIST

## 2023-12-22 PROCEDURE — 6370000000 HC RX 637 (ALT 250 FOR IP): Performed by: PODIATRIST

## 2023-12-22 PROCEDURE — 0SRG0JZ REPLACEMENT OF LEFT ANKLE JOINT WITH SYNTHETIC SUBSTITUTE, OPEN APPROACH: ICD-10-PCS | Performed by: PODIATRIST

## 2023-12-22 PROCEDURE — 3700000000 HC ANESTHESIA ATTENDED CARE: Performed by: PODIATRIST

## 2023-12-22 PROCEDURE — 7100000011 HC PHASE II RECOVERY - ADDTL 15 MIN: Performed by: PODIATRIST

## 2023-12-22 PROCEDURE — 2500000003 HC RX 250 WO HCPCS: Performed by: PODIATRIST

## 2023-12-22 PROCEDURE — G0378 HOSPITAL OBSERVATION PER HR: HCPCS

## 2023-12-22 PROCEDURE — 99222 1ST HOSP IP/OBS MODERATE 55: CPT | Performed by: PHYSICIAN ASSISTANT

## 2023-12-22 PROCEDURE — 3E033XZ INTRODUCTION OF VASOPRESSOR INTO PERIPHERAL VEIN, PERCUTANEOUS APPROACH: ICD-10-PCS | Performed by: PODIATRIST

## 2023-12-22 PROCEDURE — 2720000010 HC SURG SUPPLY STERILE: Performed by: PODIATRIST

## 2023-12-22 PROCEDURE — 0L8P0ZZ DIVISION OF LEFT LOWER LEG TENDON, OPEN APPROACH: ICD-10-PCS | Performed by: PODIATRIST

## 2023-12-22 PROCEDURE — 3600000014 HC SURGERY LEVEL 4 ADDTL 15MIN: Performed by: PODIATRIST

## 2023-12-22 PROCEDURE — 6360000002 HC RX W HCPCS: Performed by: PODIATRIST

## 2023-12-22 PROCEDURE — C1713 ANCHOR/SCREW BN/BN,TIS/BN: HCPCS | Performed by: PODIATRIST

## 2023-12-22 PROCEDURE — 3600000004 HC SURGERY LEVEL 4 BASE: Performed by: PODIATRIST

## 2023-12-22 PROCEDURE — 7100000001 HC PACU RECOVERY - ADDTL 15 MIN: Performed by: PODIATRIST

## 2023-12-22 PROCEDURE — 2580000003 HC RX 258

## 2023-12-22 PROCEDURE — 3700000001 HC ADD 15 MINUTES (ANESTHESIA): Performed by: PODIATRIST

## 2023-12-22 PROCEDURE — 7100000000 HC PACU RECOVERY - FIRST 15 MIN: Performed by: PODIATRIST

## 2023-12-22 PROCEDURE — 6370000000 HC RX 637 (ALT 250 FOR IP)

## 2023-12-22 PROCEDURE — 2709999900 HC NON-CHARGEABLE SUPPLY: Performed by: PODIATRIST

## 2023-12-22 PROCEDURE — 0SGJ0JZ FUSION OF LEFT TARSAL JOINT WITH SYNTHETIC SUBSTITUTE, OPEN APPROACH: ICD-10-PCS | Performed by: PODIATRIST

## 2023-12-22 DEVICE — CELLERATE RX 5 GM SURG PWD HYDROLYZED CLLGN: Type: IMPLANTABLE DEVICE | Site: FOOT | Status: FUNCTIONAL

## 2023-12-22 DEVICE — K-WIRE, SINGLE ENDED TROCAR TIP, SMOOTH, 2.3 X 230MM
Type: IMPLANTABLE DEVICE | Site: FOOT | Status: FUNCTIONAL
Brand: MONSTER SCREW SYSTEM

## 2023-12-22 DEVICE — 7.0 X 72 MM HEADED, CANNULATED, LONG THREAD SCREW
Type: IMPLANTABLE DEVICE | Site: FOOT | Status: FUNCTIONAL
Brand: MONSTER SCREW SYSTEM

## 2023-12-22 DEVICE — 5303, SHIP ASSY., BONE GROWTH STIMULATOR, PHYSIOSTIM, US
Type: IMPLANTABLE DEVICE | Site: FOOT | Status: FUNCTIONAL
Brand: PEMF - PHYSIO-STIM

## 2023-12-22 DEVICE — 7.0 X 70 MM HEADED, CANNULATED, LONG THREAD SCREW
Type: IMPLANTABLE DEVICE | Site: FOOT | Status: FUNCTIONAL
Brand: MONSTER SCREW SYSTEM

## 2023-12-22 RX ORDER — ACETAMINOPHEN 325 MG/1
650 TABLET ORAL EVERY 6 HOURS PRN
Status: DISCONTINUED | OUTPATIENT
Start: 2023-12-22 | End: 2023-12-28 | Stop reason: HOSPADM

## 2023-12-22 RX ORDER — POTASSIUM CHLORIDE 7.45 MG/ML
10 INJECTION INTRAVENOUS PRN
Status: DISCONTINUED | OUTPATIENT
Start: 2023-12-22 | End: 2023-12-28 | Stop reason: HOSPADM

## 2023-12-22 RX ORDER — SODIUM CHLORIDE 0.9 % (FLUSH) 0.9 %
5-40 SYRINGE (ML) INJECTION PRN
Status: DISCONTINUED | OUTPATIENT
Start: 2023-12-22 | End: 2023-12-28 | Stop reason: HOSPADM

## 2023-12-22 RX ORDER — SODIUM CHLORIDE 9 MG/ML
INJECTION, SOLUTION INTRAVENOUS CONTINUOUS
Status: DISCONTINUED | OUTPATIENT
Start: 2023-12-22 | End: 2023-12-22

## 2023-12-22 RX ORDER — OXYCODONE HYDROCHLORIDE 5 MG/1
5 TABLET ORAL EVERY 4 HOURS PRN
Status: DISCONTINUED | OUTPATIENT
Start: 2023-12-22 | End: 2023-12-28 | Stop reason: HOSPADM

## 2023-12-22 RX ORDER — SODIUM CHLORIDE 0.9 % (FLUSH) 0.9 %
5-40 SYRINGE (ML) INJECTION EVERY 12 HOURS SCHEDULED
Status: DISCONTINUED | OUTPATIENT
Start: 2023-12-22 | End: 2023-12-27 | Stop reason: SDUPTHER

## 2023-12-22 RX ORDER — FUROSEMIDE 40 MG/1
40 TABLET ORAL DAILY
Status: DISCONTINUED | OUTPATIENT
Start: 2023-12-23 | End: 2023-12-28 | Stop reason: HOSPADM

## 2023-12-22 RX ORDER — ONDANSETRON 2 MG/ML
4 INJECTION INTRAMUSCULAR; INTRAVENOUS EVERY 6 HOURS PRN
Status: DISCONTINUED | OUTPATIENT
Start: 2023-12-22 | End: 2023-12-28 | Stop reason: HOSPADM

## 2023-12-22 RX ORDER — POTASSIUM CHLORIDE 20 MEQ/1
20 TABLET, EXTENDED RELEASE ORAL 4 TIMES DAILY
Status: DISCONTINUED | OUTPATIENT
Start: 2023-12-22 | End: 2023-12-28 | Stop reason: HOSPADM

## 2023-12-22 RX ORDER — SODIUM CHLORIDE 9 MG/ML
INJECTION, SOLUTION INTRAVENOUS PRN
Status: DISCONTINUED | OUTPATIENT
Start: 2023-12-22 | End: 2023-12-22 | Stop reason: HOSPADM

## 2023-12-22 RX ORDER — MORPHINE SULFATE 4 MG/ML
4 INJECTION, SOLUTION INTRAMUSCULAR; INTRAVENOUS
Status: DISCONTINUED | OUTPATIENT
Start: 2023-12-22 | End: 2023-12-28 | Stop reason: HOSPADM

## 2023-12-22 RX ORDER — ENOXAPARIN SODIUM 100 MG/ML
40 INJECTION SUBCUTANEOUS DAILY
Status: DISCONTINUED | OUTPATIENT
Start: 2023-12-23 | End: 2023-12-28 | Stop reason: HOSPADM

## 2023-12-22 RX ORDER — SODIUM CHLORIDE 9 MG/ML
INJECTION, SOLUTION INTRAVENOUS PRN
Status: DISCONTINUED | OUTPATIENT
Start: 2023-12-22 | End: 2023-12-27 | Stop reason: SDUPTHER

## 2023-12-22 RX ORDER — ASCORBIC ACID 500 MG
250 TABLET ORAL DAILY
Status: DISCONTINUED | OUTPATIENT
Start: 2023-12-22 | End: 2023-12-28 | Stop reason: HOSPADM

## 2023-12-22 RX ORDER — SODIUM CHLORIDE 9 MG/ML
INJECTION, SOLUTION INTRAVENOUS CONTINUOUS
Status: ACTIVE | OUTPATIENT
Start: 2023-12-22 | End: 2023-12-23

## 2023-12-22 RX ORDER — SODIUM CHLORIDE 9 MG/ML
INJECTION, SOLUTION INTRAVENOUS PRN
Status: DISCONTINUED | OUTPATIENT
Start: 2023-12-22 | End: 2023-12-28 | Stop reason: HOSPADM

## 2023-12-22 RX ORDER — SODIUM CHLORIDE 9 MG/ML
INJECTION, SOLUTION INTRAVENOUS CONTINUOUS
Status: DISCONTINUED | OUTPATIENT
Start: 2023-12-22 | End: 2023-12-22 | Stop reason: HOSPADM

## 2023-12-22 RX ORDER — SODIUM CHLORIDE 0.9 % (FLUSH) 0.9 %
5-40 SYRINGE (ML) INJECTION EVERY 12 HOURS SCHEDULED
Status: DISCONTINUED | OUTPATIENT
Start: 2023-12-22 | End: 2023-12-22 | Stop reason: HOSPADM

## 2023-12-22 RX ORDER — ACETAMINOPHEN 500 MG
1000 TABLET ORAL
Status: COMPLETED | OUTPATIENT
Start: 2023-12-22 | End: 2023-12-22

## 2023-12-22 RX ORDER — MAGNESIUM SULFATE IN WATER 40 MG/ML
2000 INJECTION, SOLUTION INTRAVENOUS PRN
Status: DISCONTINUED | OUTPATIENT
Start: 2023-12-22 | End: 2023-12-28 | Stop reason: HOSPADM

## 2023-12-22 RX ORDER — POTASSIUM CHLORIDE 20 MEQ/1
40 TABLET, EXTENDED RELEASE ORAL PRN
Status: DISCONTINUED | OUTPATIENT
Start: 2023-12-22 | End: 2023-12-28 | Stop reason: HOSPADM

## 2023-12-22 RX ORDER — SODIUM CHLORIDE 9 MG/ML
INJECTION, SOLUTION INTRAVENOUS CONTINUOUS
Status: DISCONTINUED | OUTPATIENT
Start: 2023-12-22 | End: 2023-12-24

## 2023-12-22 RX ORDER — SODIUM CHLORIDE 0.9 % (FLUSH) 0.9 %
5-40 SYRINGE (ML) INJECTION PRN
Status: DISCONTINUED | OUTPATIENT
Start: 2023-12-22 | End: 2023-12-22 | Stop reason: HOSPADM

## 2023-12-22 RX ORDER — SODIUM CHLORIDE 0.9 % (FLUSH) 0.9 %
5-40 SYRINGE (ML) INJECTION EVERY 12 HOURS SCHEDULED
Status: DISCONTINUED | OUTPATIENT
Start: 2023-12-22 | End: 2023-12-28 | Stop reason: HOSPADM

## 2023-12-22 RX ORDER — NIFEDIPINE 90 MG/1
90 TABLET, EXTENDED RELEASE ORAL DAILY
Status: DISCONTINUED | OUTPATIENT
Start: 2023-12-22 | End: 2023-12-28 | Stop reason: HOSPADM

## 2023-12-22 RX ORDER — OXYCODONE HYDROCHLORIDE 5 MG/1
10 TABLET ORAL EVERY 4 HOURS PRN
Status: DISCONTINUED | OUTPATIENT
Start: 2023-12-22 | End: 2023-12-28 | Stop reason: HOSPADM

## 2023-12-22 RX ORDER — SODIUM CHLORIDE 0.9 % (FLUSH) 0.9 %
5-40 SYRINGE (ML) INJECTION PRN
Status: DISCONTINUED | OUTPATIENT
Start: 2023-12-22 | End: 2023-12-27 | Stop reason: SDUPTHER

## 2023-12-22 RX ORDER — ONDANSETRON 4 MG/1
4 TABLET, ORALLY DISINTEGRATING ORAL EVERY 8 HOURS PRN
Status: DISCONTINUED | OUTPATIENT
Start: 2023-12-22 | End: 2023-12-28 | Stop reason: HOSPADM

## 2023-12-22 RX ORDER — ATENOLOL 50 MG/1
50 TABLET ORAL DAILY
Status: DISCONTINUED | OUTPATIENT
Start: 2023-12-22 | End: 2023-12-28 | Stop reason: HOSPADM

## 2023-12-22 RX ORDER — POLYETHYLENE GLYCOL 3350 17 G/17G
17 POWDER, FOR SOLUTION ORAL DAILY PRN
Status: DISCONTINUED | OUTPATIENT
Start: 2023-12-22 | End: 2023-12-28 | Stop reason: HOSPADM

## 2023-12-22 RX ORDER — ACETAMINOPHEN 650 MG/1
650 SUPPOSITORY RECTAL EVERY 6 HOURS PRN
Status: DISCONTINUED | OUTPATIENT
Start: 2023-12-22 | End: 2023-12-28 | Stop reason: HOSPADM

## 2023-12-22 RX ORDER — VITAMIN B COMPLEX
1000 TABLET ORAL DAILY
Status: DISCONTINUED | OUTPATIENT
Start: 2023-12-22 | End: 2023-12-28 | Stop reason: HOSPADM

## 2023-12-22 RX ORDER — LEVOTHYROXINE SODIUM 0.1 MG/1
100 TABLET ORAL DAILY
Status: DISCONTINUED | OUTPATIENT
Start: 2023-12-22 | End: 2023-12-28 | Stop reason: HOSPADM

## 2023-12-22 RX ORDER — ACETAMINOPHEN 500 MG
TABLET ORAL
Status: COMPLETED
Start: 2023-12-22 | End: 2023-12-22

## 2023-12-22 RX ORDER — MORPHINE SULFATE 2 MG/ML
2 INJECTION, SOLUTION INTRAMUSCULAR; INTRAVENOUS
Status: DISCONTINUED | OUTPATIENT
Start: 2023-12-22 | End: 2023-12-28 | Stop reason: HOSPADM

## 2023-12-22 RX ADMIN — SODIUM CHLORIDE: 9 INJECTION, SOLUTION INTRAVENOUS at 17:40

## 2023-12-22 RX ADMIN — OXYCODONE 10 MG: 5 TABLET ORAL at 17:33

## 2023-12-22 RX ADMIN — Medication 1000 UNITS: at 17:33

## 2023-12-22 RX ADMIN — OXYCODONE HYDROCHLORIDE AND ACETAMINOPHEN 250 MG: 500 TABLET ORAL at 17:33

## 2023-12-22 RX ADMIN — ACETAMINOPHEN 1000 MG: 500 TABLET ORAL at 11:55

## 2023-12-22 RX ADMIN — POTASSIUM CHLORIDE 20 MEQ: 1500 TABLET, EXTENDED RELEASE ORAL at 17:33

## 2023-12-22 RX ADMIN — MORPHINE SULFATE 4 MG: 4 INJECTION, SOLUTION INTRAMUSCULAR; INTRAVENOUS at 23:49

## 2023-12-22 RX ADMIN — OXYCODONE 10 MG: 5 TABLET ORAL at 11:55

## 2023-12-22 RX ADMIN — LEVOTHYROXINE SODIUM 100 MCG: 0.1 TABLET ORAL at 17:33

## 2023-12-22 RX ADMIN — SODIUM CHLORIDE: 9 INJECTION, SOLUTION INTRAVENOUS at 07:05

## 2023-12-22 RX ADMIN — NIFEDIPINE 90 MG: 90 TABLET, EXTENDED RELEASE ORAL at 17:33

## 2023-12-22 RX ADMIN — OXYCODONE 10 MG: 5 TABLET ORAL at 21:32

## 2023-12-22 RX ADMIN — POTASSIUM CHLORIDE 20 MEQ: 1500 TABLET, EXTENDED RELEASE ORAL at 20:16

## 2023-12-22 RX ADMIN — ATENOLOL 50 MG: 50 TABLET ORAL at 17:33

## 2023-12-22 RX ADMIN — Medication 1000 MG: at 11:55

## 2023-12-22 NOTE — H&P
[facility]  History and Physical Update    Pt Name: Deidra Nicole  MRN: [id number]  YOB: 1960  Date of evaluation: 12/22/2023    [x] I have examined the patient and reviewed the H&P/Consult and there are no changes to the patient or plans.     [] I have examined the patient and reviewed the H&P/Consult and have noted the following changes:        Sherren Husky, DPM DPM  Electronically signed 12/22/2023 at 7:17 AM

## 2023-12-22 NOTE — BRIEF OP NOTE
Brief Postoperative Note      Patient: Deidra Nicole  YOB: 1960  MRN: [id number]    Date of Procedure: 12/22/2023    Pre-Op Diagnosis Codes:     * Nondisplaced fracture of navicular (scaphoid) of left foot, sequela [S92.255S]     * Spontaneous rupture of other tendons, left ankle and foot [M66.872]    Post-Op Diagnosis: Same       Procedure(s):  Total Talus and Navicular Replacement With Titanium Implant- Left Foot, Arthrodesis of 1st, 2nd, and 3rd Cuniform to Navicular Implant with Staples Fixation- Left Foot, Arthrodesis Subtalar Joint with Talus Component Implant- Left Foot,    Surgeon(s):  Sherren Husky, DPM    Assistant:  Resident: Sarkis Beth DPM; Natanael Wise DPM    Anesthesia: General    Estimated Blood Loss (mL): less than 50     Complications: None    Specimens:   * No specimens in log *    Implants:  Implant Name Type Inv.  Item Serial No.  Lot No. LRB No. Used Action   CELLERATE RX 5 GM SURG PWD HYDROLYZED CLLGN - [id number]  CELLERATE RX 5 GM SURG PWD HYDROLYZED CLLGN  [employer]WD  Left 1 Implanted   STIMULATOR BNE EXT PHYSIOSTIM - [id number]  STIMULATOR BNE EXT PHYSIOSTIM [id number] OR[employer]OFIX INC-WD  Left 1 Implanted   STIMULATOR BONE GROWTH PHYSIO-STIM - [id number]  STIMULATOR BONE GROWTH PHYSIO-STIM  [employer]-WD  Left 1 Implanted   [place] Straight Staple Kit REF [id number]     [id number] Left 1 Implanted   [place] Straight Staple Kit REF [id number]     [id number] Left 1 Implanted   [place] Straight Staple Kit REF [id number]     [id number] Left 1 Implanted   SCREW BNE L70MM OD7MM THRD L33MM PUR HINDFOOT ANK PADMINI JENKINSG - [id number]  SCREW BNE L70MM OD7MM THRD L33MM PUR HINDFOOT ANK PADMINI VELASCO  PARAGON 28-WD  Left 1 Implanted   Custom Talus Implant- small      Left 1 Implanted   SCREW BNE L72MM OD7MM THRD L33MM PUR HINDFOOT ANK PADMINI VELASCO - [id number]  SCREW BNE L72MM OD7MM THRD L33MM PUR HINDFOOT ANK PADMINI LNG  PARAGON 28-WD  Left 1 Implanted   KWIRE FIX [id number]

## 2023-12-22 NOTE — DISCHARGE INSTRUCTIONS
Post Op Instructions    Pt Name: Harsh Uribe  Medical Record Number: [id number]  Today's Date: 12/22/2023    GENERAL ANESTHESIA OR SEDATION  1. Do not drive or operate hazardous machinery for 24 hours. 2. Do not make important business or personal decisions for 24 hours. 3. Do not drink alcoholic beverages or use tobacco for 24 hours. ACTIVITY INSTRUCTIONS:  [] Rest today. Resume light to normal activity tomorrow.   [] You may ambulate as tolerated in your post op shoe/boot. [x] No weight is to be placed on the operative limb. Use crutches, walker, Roll-a-bout as needed. [x]Elevate the operative limb as much as possible to reduce swelling and discomfort for the first 72 hours      DIET INSTRUCTIONS:  []Begin with clear liquids. If not nauseated, may increase to a low-fat diet when you desire. [x]Regular diet as tolerated. []Other:     MEDICATIONS  [x]Prescription sent with you to be used as directed. Do not drink alcohol or drive while taking these medications. You may experience dizziness or drowsiness with these medications. You may also experience constipation which can be relieved with stool softners or laxatives. [x]You may resume your daily prescription medication schedule unless otherwise specified. [x]If taking 325mg Aspirin or other blood thinners such as Coumadin or Plavix, you may resume tomorrow, unless told otherwise. WOUND/DRESSING INSTRUCTIONS:  Always ensure you and your care giver clean hands before and after caring for the wound. [x] Keep dressing clean and dry until you are seen in the offfice      [x] Ice operative site for 20 minutes 4 times a day. - you may apply ice bag behind the knee or directly on the foot/ankle bandage.   - do not apply ice directly to the skin  [x] You may loosen the ACE wrap if it feels too tight, but do not disturb the underlying white gauze wrap. FOLLOW-UP CARE.  SPECIFICALLY WATCH FOR:   Fever over 101 degrees by mouth   Increased

## 2023-12-22 NOTE — OP NOTE
Operative Note      Patient: Guicho Castrejon  YOB: 1960  MRN: [id number]    Date of Procedure: 12/22/2023    Pre-Op Diagnosis Codes:     * Nondisplaced fracture of navicular (scaphoid) of left foot, sequela [S92.255S]     * Spontaneous rupture of other tendons, left ankle and foot [M66.872]    Post-Op Diagnosis: Same       Procedure(s):  Total Talus and Navicular Replacement With Titanium Implant- Left Foot, Arthrodesis of 1st, 2nd, and 3rd Cuniform to Navicular Implant with Staples Fixation- Left Foot, Arthrodesis Subtalar Joint with Talus Component Implant- Left Foot, Gastrocnemius recession, left. Tendo achilles lengthening, left. Surgeon(s):  Elizabeth Lorenzo DPM    Assistant:  Resident: Jade Richardson DPM; Thersia Seip, DPM    Anesthesia: General    Estimated Blood Loss (mL): less than 50     Complications: None    Specimens:   * No specimens in log *    Implants:  Implant Name Type Inv.  Item Serial No.  Lot No. LRB No. Used Action   CELLERATE RX 5 GM SURG PWD HYDROLYZED CLLGN - [id number]  CELLERATE RX 5 GM SURG PWD HYDROLYZED CLLGN  [employer]WD  Left 1 Implanted   STIMULATOR BNE EXT PHYSIOSTIM - [id number]  STIMULATOR BNE EXT PHYSIOSTIM [id number] [employer]-WD  Left 1 Implanted   STIMULATOR BONE GROWTH PHYSIO-STIM - [id number]  STIMULATOR BONE GROWTH PHYSIO-STIM  [employer]-WD  Left 1 Implanted   [place] Straight Staple Kit REF [id number]     [id number] Left 1 Implanted   [place] Straight Staple Kit REF [id number]     [id number] Left 1 Implanted   [place] Straight Staple Kit REF [id number]     [id number] Left 1 Implanted   SCREW BNE L70MM OD7MM THRD L33MM PUR HINDFOOT ANK PADMINI LNG - [id number]  SCREW BNE L70MM OD7MM THRD L33MM PUR HINDFOOT ANK PADMINI LNG  PARAGON 28-WD  Left 1 Implanted   Custom Talus Implant- small      Left 1 Implanted   SCREW BNE L72MM OD7MM THRD L33MM PUR HINDFOOT ANK PADMINI LNG - [id number]  SCREW BNE L72MM OD7MM THRD L33MM PUR HINDFOOT ANK PADMINI

## 2023-12-23 LAB
ANION GAP SERPL CALC-SCNC: 7 MEQ/L (ref 8–16)
BACTERIA: ABNORMAL
BILIRUB UR QL STRIP: ABNORMAL
BUN SERPL-MCNC: 14 MG/DL (ref 7–22)
CALCIUM SERPL-MCNC: 8.2 MG/DL (ref 8.5–10.5)
CASTS #/AREA URNS LPF: ABNORMAL /LPF
CASTS #/AREA URNS LPF: ABNORMAL /LPF
CHARACTER UR: CLEAR
CHARCOAL URNS QL MICRO: ABNORMAL
CHLORIDE SERPL-SCNC: 107 MEQ/L (ref 98–111)
CO2 SERPL-SCNC: 23 MEQ/L (ref 23–33)
COLOR UR: ABNORMAL
CREAT SERPL-MCNC: 0.7 MG/DL (ref 0.4–1.2)
CRYSTALS URNS QL MICRO: ABNORMAL
EPITHELIAL CELLS, UA: ABNORMAL /HPF
GFR SERPL CREATININE-BSD FRML MDRD: > 60 ML/MIN/1.73M2
GLUCOSE SERPL-MCNC: 102 MG/DL (ref 70–108)
GLUCOSE UR QL STRIP.AUTO: NEGATIVE MG/DL
HGB UR QL STRIP.AUTO: NEGATIVE
ICTOTEST: NEGATIVE
KETONES UR QL STRIP.AUTO: NEGATIVE
LEUKOCYTE ESTERASE UR QL STRIP.AUTO: ABNORMAL
NITRITE UR QL STRIP.AUTO: POSITIVE
PH UR STRIP.AUTO: 5 [PH] (ref 5–9)
POTASSIUM SERPL-SCNC: 4.2 MEQ/L (ref 3.5–5.2)
PROT UR STRIP.AUTO-MCNC: NEGATIVE MG/DL
RBC #/AREA URNS HPF: ABNORMAL /HPF
RENAL EPI CELLS #/AREA URNS HPF: ABNORMAL /[HPF]
SODIUM SERPL-SCNC: 137 MEQ/L (ref 135–145)
SPECIFIC GRAVITY UA: 1.01 (ref 1–1.03)
UROBILINOGEN, URINE: 1 EU/DL (ref 0–1)
WBC #/AREA URNS HPF: ABNORMAL /HPF
YEAST LIKE FUNGI URNS QL MICRO: ABNORMAL

## 2023-12-23 PROCEDURE — 51701 INSERT BLADDER CATHETER: CPT

## 2023-12-23 PROCEDURE — 6370000000 HC RX 637 (ALT 250 FOR IP): Performed by: PODIATRIST

## 2023-12-23 PROCEDURE — 96372 THER/PROPH/DIAG INJ SC/IM: CPT

## 2023-12-23 PROCEDURE — 6360000002 HC RX W HCPCS: Performed by: PODIATRIST

## 2023-12-23 PROCEDURE — G0378 HOSPITAL OBSERVATION PER HR: HCPCS

## 2023-12-23 PROCEDURE — 87186 SC STD MICRODIL/AGAR DIL: CPT

## 2023-12-23 PROCEDURE — 96361 HYDRATE IV INFUSION ADD-ON: CPT

## 2023-12-23 PROCEDURE — 80048 BASIC METABOLIC PNL TOTAL CA: CPT

## 2023-12-23 PROCEDURE — 87086 URINE CULTURE/COLONY COUNT: CPT

## 2023-12-23 PROCEDURE — 51702 INSERT TEMP BLADDER CATH: CPT

## 2023-12-23 PROCEDURE — 2580000003 HC RX 258: Performed by: PODIATRIST

## 2023-12-23 PROCEDURE — 96374 THER/PROPH/DIAG INJ IV PUSH: CPT

## 2023-12-23 PROCEDURE — 36415 COLL VENOUS BLD VENIPUNCTURE: CPT

## 2023-12-23 PROCEDURE — 51798 US URINE CAPACITY MEASURE: CPT

## 2023-12-23 PROCEDURE — 81001 URINALYSIS AUTO W/SCOPE: CPT

## 2023-12-23 PROCEDURE — 6370000000 HC RX 637 (ALT 250 FOR IP): Performed by: NURSE PRACTITIONER

## 2023-12-23 PROCEDURE — 6370000000 HC RX 637 (ALT 250 FOR IP): Performed by: PHYSICIAN ASSISTANT

## 2023-12-23 RX ORDER — BISACODYL 10 MG
10 SUPPOSITORY, RECTAL RECTAL DAILY PRN
Status: DISCONTINUED | OUTPATIENT
Start: 2023-12-23 | End: 2023-12-28 | Stop reason: HOSPADM

## 2023-12-23 RX ORDER — PHENAZOPYRIDINE HYDROCHLORIDE 200 MG/1
200 TABLET, FILM COATED ORAL
Status: DISCONTINUED | OUTPATIENT
Start: 2023-12-23 | End: 2023-12-28 | Stop reason: HOSPADM

## 2023-12-23 RX ADMIN — ATENOLOL 50 MG: 50 TABLET ORAL at 08:54

## 2023-12-23 RX ADMIN — OXYCODONE 10 MG: 5 TABLET ORAL at 08:53

## 2023-12-23 RX ADMIN — POTASSIUM CHLORIDE 20 MEQ: 1500 TABLET, EXTENDED RELEASE ORAL at 18:57

## 2023-12-23 RX ADMIN — POTASSIUM CHLORIDE 20 MEQ: 1500 TABLET, EXTENDED RELEASE ORAL at 08:55

## 2023-12-23 RX ADMIN — OXYCODONE 10 MG: 5 TABLET ORAL at 04:02

## 2023-12-23 RX ADMIN — ENOXAPARIN SODIUM 40 MG: 100 INJECTION SUBCUTANEOUS at 08:52

## 2023-12-23 RX ADMIN — MORPHINE SULFATE 4 MG: 4 INJECTION, SOLUTION INTRAMUSCULAR; INTRAVENOUS at 11:18

## 2023-12-23 RX ADMIN — POTASSIUM CHLORIDE 20 MEQ: 1500 TABLET, EXTENDED RELEASE ORAL at 21:02

## 2023-12-23 RX ADMIN — LEVOTHYROXINE SODIUM 100 MCG: 0.1 TABLET ORAL at 08:54

## 2023-12-23 RX ADMIN — OXYCODONE HYDROCHLORIDE AND ACETAMINOPHEN 250 MG: 500 TABLET ORAL at 08:52

## 2023-12-23 RX ADMIN — Medication 1000 UNITS: at 08:52

## 2023-12-23 RX ADMIN — OXYCODONE 10 MG: 5 TABLET ORAL at 23:07

## 2023-12-23 RX ADMIN — POTASSIUM CHLORIDE 20 MEQ: 1500 TABLET, EXTENDED RELEASE ORAL at 15:06

## 2023-12-23 RX ADMIN — PHENAZOPYRIDINE 200 MG: 200 TABLET ORAL at 18:57

## 2023-12-23 RX ADMIN — PHENAZOPYRIDINE 200 MG: 200 TABLET ORAL at 15:06

## 2023-12-23 RX ADMIN — SODIUM CHLORIDE, PRESERVATIVE FREE 10 ML: 5 INJECTION INTRAVENOUS at 21:03

## 2023-12-23 RX ADMIN — FUROSEMIDE 40 MG: 40 TABLET ORAL at 08:54

## 2023-12-23 RX ADMIN — NIFEDIPINE 90 MG: 90 TABLET, EXTENDED RELEASE ORAL at 08:55

## 2023-12-23 RX ADMIN — OXYCODONE 10 MG: 5 TABLET ORAL at 19:05

## 2023-12-23 NOTE — PLAN OF CARE
Nursing reports pain over bladder. Bladder scan ordered for PVR. Pyridium. UA culture sent already. Will add UA with micro.       Sy Loving, LANA - CNP

## 2023-12-23 NOTE — PROCEDURES
Bladder scan completed at 1530 and results told to [facility]. Scan showed 425 mL in the patients bladder. Last void was unknown.  Jean Paul Michaels CET

## 2023-12-23 NOTE — PLAN OF CARE
Problem: Pain  Goal: Verbalizes/displays adequate comfort level or baseline comfort level  Outcome: Progressing   Patient has c/o pain. Prn pain medication administered. Patient resting quietly in bed with no signs of distress. Problem: Safety - Adult  Goal: Free from fall injury  Outcome: Progressing     Problem: Discharge Planning  Goal: Discharge to home or other facility with appropriate resources  Outcome: Progressing     Care plans reviewed with patient. Patient voiced understanding.

## 2023-12-23 NOTE — PLAN OF CARE
Problem: Discharge Planning  Goal: Discharge to home or other facility with appropriate resources  Outcome: Progressing  Flowsheets (Taken 12/23/2023 1854)  Discharge to home or other facility with appropriate resources:   Identify barriers to discharge with patient and caregiver   Identify discharge learning needs (meds, wound care, etc)     Problem: Safety - Adult  Goal: Free from fall injury  Outcome: Progressing  Flowsheets (Taken 12/23/2023 1854)  Free From Fall Injury: Instruct family/caregiver on patient safety     Problem: Pain  Goal: Verbalizes/displays adequate comfort level or baseline comfort level  Outcome: Progressing  Flowsheets (Taken 12/23/2023 1854)  Verbalizes/displays adequate comfort level or baseline comfort level:   Encourage patient to monitor pain and request assistance   Assess pain using appropriate pain scale   Administer analgesics based on type and severity of pain and evaluate response   Implement non-pharmacological measures as appropriate and evaluate response

## 2023-12-23 NOTE — PROCEDURES
Bladder scan completed at 1200 and results told to [facility]. Scan showed 600 mL in the patients bladder. Last void was unknown.  Sofia Galvin CET

## 2023-12-24 LAB
ANION GAP SERPL CALC-SCNC: 10 MEQ/L (ref 8–16)
BASOPHILS ABSOLUTE: 0 THOU/MM3 (ref 0–0.1)
BASOPHILS NFR BLD AUTO: 0.2 %
BUN SERPL-MCNC: 10 MG/DL (ref 7–22)
CA-I BLD ISE-SCNC: 1.11 MMOL/L (ref 1.12–1.32)
CALCIUM SERPL-MCNC: 8.6 MG/DL (ref 8.5–10.5)
CHLORIDE SERPL-SCNC: 102 MEQ/L (ref 98–111)
CO2 SERPL-SCNC: 28 MEQ/L (ref 23–33)
CREAT SERPL-MCNC: 0.8 MG/DL (ref 0.4–1.2)
DEPRECATED RDW RBC AUTO: 48.4 FL (ref 35–45)
EOSINOPHIL NFR BLD AUTO: 0.4 %
EOSINOPHILS ABSOLUTE: 0 THOU/MM3 (ref 0–0.4)
ERYTHROCYTE [DISTWIDTH] IN BLOOD BY AUTOMATED COUNT: 14.5 % (ref 11.5–14.5)
GFR SERPL CREATININE-BSD FRML MDRD: > 60 ML/MIN/1.73M2
GLUCOSE SERPL-MCNC: 117 MG/DL (ref 70–108)
HCT VFR BLD AUTO: 39.1 % (ref 37–47)
HGB BLD-MCNC: 12.4 GM/DL (ref 12–16)
IMM GRANULOCYTES # BLD AUTO: 0.05 THOU/MM3 (ref 0–0.07)
IMM GRANULOCYTES NFR BLD AUTO: 0.4 %
LYMPHOCYTES ABSOLUTE: 1.4 THOU/MM3 (ref 1–4.8)
LYMPHOCYTES NFR BLD AUTO: 12 %
MCH RBC QN AUTO: 29.2 PG (ref 26–33)
MCHC RBC AUTO-ENTMCNC: 31.7 GM/DL (ref 32.2–35.5)
MCV RBC AUTO: 92 FL (ref 81–99)
MONOCYTES ABSOLUTE: 1 THOU/MM3 (ref 0.4–1.3)
MONOCYTES NFR BLD AUTO: 8.7 %
NEUTROPHILS NFR BLD AUTO: 78.3 %
NRBC BLD AUTO-RTO: 0 /100 WBC
PLATELET # BLD AUTO: 200 THOU/MM3 (ref 130–400)
PMV BLD AUTO: 11.3 FL (ref 9.4–12.4)
POTASSIUM SERPL-SCNC: 3.7 MEQ/L (ref 3.5–5.2)
RBC # BLD AUTO: 4.25 MILL/MM3 (ref 4.2–5.4)
SEGMENTED NEUTROPHILS ABSOLUTE COUNT: 8.8 THOU/MM3 (ref 1.8–7.7)
SODIUM SERPL-SCNC: 140 MEQ/L (ref 135–145)
WBC # BLD AUTO: 11.3 THOU/MM3 (ref 4.8–10.8)

## 2023-12-24 PROCEDURE — 97166 OT EVAL MOD COMPLEX 45 MIN: CPT

## 2023-12-24 PROCEDURE — 85025 COMPLETE CBC W/AUTO DIFF WBC: CPT

## 2023-12-24 PROCEDURE — G0378 HOSPITAL OBSERVATION PER HR: HCPCS

## 2023-12-24 PROCEDURE — 6370000000 HC RX 637 (ALT 250 FOR IP): Performed by: PODIATRIST

## 2023-12-24 PROCEDURE — 96376 TX/PRO/DX INJ SAME DRUG ADON: CPT

## 2023-12-24 PROCEDURE — 80048 BASIC METABOLIC PNL TOTAL CA: CPT

## 2023-12-24 PROCEDURE — 97530 THERAPEUTIC ACTIVITIES: CPT

## 2023-12-24 PROCEDURE — 6360000002 HC RX W HCPCS: Performed by: PODIATRIST

## 2023-12-24 PROCEDURE — 36415 COLL VENOUS BLD VENIPUNCTURE: CPT

## 2023-12-24 PROCEDURE — 6370000000 HC RX 637 (ALT 250 FOR IP): Performed by: PHYSICIAN ASSISTANT

## 2023-12-24 PROCEDURE — 97162 PT EVAL MOD COMPLEX 30 MIN: CPT

## 2023-12-24 PROCEDURE — 96372 THER/PROPH/DIAG INJ SC/IM: CPT

## 2023-12-24 PROCEDURE — 2580000003 HC RX 258: Performed by: PODIATRIST

## 2023-12-24 PROCEDURE — 97535 SELF CARE MNGMENT TRAINING: CPT

## 2023-12-24 PROCEDURE — 6360000002 HC RX W HCPCS: Performed by: NURSE PRACTITIONER

## 2023-12-24 PROCEDURE — 2580000003 HC RX 258: Performed by: NURSE PRACTITIONER

## 2023-12-24 PROCEDURE — 82330 ASSAY OF CALCIUM: CPT

## 2023-12-24 PROCEDURE — 99232 SBSQ HOSP IP/OBS MODERATE 35: CPT | Performed by: NURSE PRACTITIONER

## 2023-12-24 PROCEDURE — 6370000000 HC RX 637 (ALT 250 FOR IP): Performed by: NURSE PRACTITIONER

## 2023-12-24 RX ORDER — CALCIUM GLUCONATE 20 MG/ML
2000 INJECTION, SOLUTION INTRAVENOUS PRN
Status: DISCONTINUED | OUTPATIENT
Start: 2023-12-24 | End: 2023-12-28 | Stop reason: HOSPADM

## 2023-12-24 RX ADMIN — ENOXAPARIN SODIUM 40 MG: 100 INJECTION SUBCUTANEOUS at 09:19

## 2023-12-24 RX ADMIN — OXYCODONE 10 MG: 5 TABLET ORAL at 15:59

## 2023-12-24 RX ADMIN — LEVOTHYROXINE SODIUM 100 MCG: 0.1 TABLET ORAL at 09:15

## 2023-12-24 RX ADMIN — SODIUM CHLORIDE, PRESERVATIVE FREE 10 ML: 5 INJECTION INTRAVENOUS at 12:53

## 2023-12-24 RX ADMIN — NIFEDIPINE 90 MG: 90 TABLET, EXTENDED RELEASE ORAL at 09:15

## 2023-12-24 RX ADMIN — ACETAMINOPHEN 650 MG: 325 TABLET ORAL at 10:53

## 2023-12-24 RX ADMIN — PHENAZOPYRIDINE 200 MG: 200 TABLET ORAL at 12:56

## 2023-12-24 RX ADMIN — POLYETHYLENE GLYCOL 3350 17 G: 17 POWDER, FOR SOLUTION ORAL at 09:19

## 2023-12-24 RX ADMIN — POTASSIUM CHLORIDE 20 MEQ: 1500 TABLET, EXTENDED RELEASE ORAL at 20:40

## 2023-12-24 RX ADMIN — MORPHINE SULFATE 4 MG: 4 INJECTION, SOLUTION INTRAMUSCULAR; INTRAVENOUS at 22:28

## 2023-12-24 RX ADMIN — Medication 1000 UNITS: at 10:53

## 2023-12-24 RX ADMIN — OXYCODONE 10 MG: 5 TABLET ORAL at 20:41

## 2023-12-24 RX ADMIN — FUROSEMIDE 40 MG: 40 TABLET ORAL at 09:15

## 2023-12-24 RX ADMIN — POTASSIUM CHLORIDE 40 MEQ: 1500 TABLET, EXTENDED RELEASE ORAL at 09:52

## 2023-12-24 RX ADMIN — OXYCODONE 10 MG: 5 TABLET ORAL at 07:09

## 2023-12-24 RX ADMIN — POTASSIUM CHLORIDE 20 MEQ: 1500 TABLET, EXTENDED RELEASE ORAL at 09:19

## 2023-12-24 RX ADMIN — PHENAZOPYRIDINE 200 MG: 200 TABLET ORAL at 09:15

## 2023-12-24 RX ADMIN — PIPERACILLIN AND TAZOBACTAM 3375 MG: 3; .375 INJECTION, POWDER, FOR SOLUTION INTRAVENOUS at 18:54

## 2023-12-24 RX ADMIN — ATENOLOL 50 MG: 50 TABLET ORAL at 09:15

## 2023-12-24 RX ADMIN — PHENAZOPYRIDINE 200 MG: 200 TABLET ORAL at 16:01

## 2023-12-24 RX ADMIN — OXYCODONE HYDROCHLORIDE AND ACETAMINOPHEN 250 MG: 500 TABLET ORAL at 09:15

## 2023-12-24 RX ADMIN — OXYCODONE 10 MG: 5 TABLET ORAL at 03:07

## 2023-12-24 RX ADMIN — PIPERACILLIN AND TAZOBACTAM 4500 MG: 4; .5 INJECTION, POWDER, FOR SOLUTION INTRAVENOUS at 12:56

## 2023-12-24 NOTE — PLAN OF CARE
Problem: Discharge Planning  Goal: Discharge to home or other facility with appropriate resources  12/23/2023 2120 by Claudia Izaguirre RN  Outcome: Progressing  12/23/2023 1854 by Ran Landaverde LPN  Outcome: Progressing  Flowsheets (Taken 12/23/2023 1854)  Discharge to home or other facility with appropriate resources:   Identify barriers to discharge with patient and caregiver   Identify discharge learning needs (meds, wound care, etc)     Problem: Safety - Adult  Goal: Free from fall injury  12/23/2023 2120 by Claudia Izaguirre RN  Outcome: Progressing  12/23/2023 1854 by Ran Landaverde LPN  Outcome: Progressing  Flowsheets (Taken 12/23/2023 1854)  Free From Fall Injury: Instruct family/caregiver on patient safety     Problem: Pain  Goal: Verbalizes/displays adequate comfort level or baseline comfort level  12/23/2023 2120 by Claudia Izaguirre RN  Outcome: Progressing  12/23/2023 1854 by Ran Landaverde LPN  Outcome: Progressing  Flowsheets (Taken 12/23/2023 1854)  Verbalizes/displays adequate comfort level or baseline comfort level:   Encourage patient to monitor pain and request assistance   Assess pain using appropriate pain scale   Administer analgesics based on type and severity of pain and evaluate response   Implement non-pharmacological measures as appropriate and evaluate response   Patient has c/o pain. Prn pain medication administered. Patient resting in bed with no signs of distress.     Care plans reviewed with patient, patient voiced understanding

## 2023-12-25 LAB
BACTERIA UR CULT: ABNORMAL
ORGANISM: ABNORMAL

## 2023-12-25 PROCEDURE — 96372 THER/PROPH/DIAG INJ SC/IM: CPT

## 2023-12-25 PROCEDURE — G0378 HOSPITAL OBSERVATION PER HR: HCPCS

## 2023-12-25 PROCEDURE — 6360000002 HC RX W HCPCS: Performed by: PODIATRIST

## 2023-12-25 PROCEDURE — 6370000000 HC RX 637 (ALT 250 FOR IP): Performed by: PHYSICIAN ASSISTANT

## 2023-12-25 PROCEDURE — 2580000003 HC RX 258: Performed by: PODIATRIST

## 2023-12-25 PROCEDURE — 6360000002 HC RX W HCPCS: Performed by: NURSE PRACTITIONER

## 2023-12-25 PROCEDURE — 6370000000 HC RX 637 (ALT 250 FOR IP): Performed by: PODIATRIST

## 2023-12-25 PROCEDURE — 2580000003 HC RX 258: Performed by: NURSE PRACTITIONER

## 2023-12-25 PROCEDURE — 6370000000 HC RX 637 (ALT 250 FOR IP): Performed by: NURSE PRACTITIONER

## 2023-12-25 RX ADMIN — PHENAZOPYRIDINE 200 MG: 200 TABLET ORAL at 08:43

## 2023-12-25 RX ADMIN — FUROSEMIDE 40 MG: 40 TABLET ORAL at 08:43

## 2023-12-25 RX ADMIN — PIPERACILLIN AND TAZOBACTAM 3375 MG: 3; .375 INJECTION, POWDER, FOR SOLUTION INTRAVENOUS at 03:35

## 2023-12-25 RX ADMIN — POTASSIUM CHLORIDE 20 MEQ: 1500 TABLET, EXTENDED RELEASE ORAL at 12:53

## 2023-12-25 RX ADMIN — PHENAZOPYRIDINE 200 MG: 200 TABLET ORAL at 12:53

## 2023-12-25 RX ADMIN — POLYETHYLENE GLYCOL 3350 17 G: 17 POWDER, FOR SOLUTION ORAL at 08:44

## 2023-12-25 RX ADMIN — OXYCODONE HYDROCHLORIDE AND ACETAMINOPHEN 250 MG: 500 TABLET ORAL at 08:45

## 2023-12-25 RX ADMIN — ENOXAPARIN SODIUM 40 MG: 100 INJECTION SUBCUTANEOUS at 08:44

## 2023-12-25 RX ADMIN — SODIUM CHLORIDE, PRESERVATIVE FREE 10 ML: 5 INJECTION INTRAVENOUS at 12:53

## 2023-12-25 RX ADMIN — PHENAZOPYRIDINE 200 MG: 200 TABLET ORAL at 16:13

## 2023-12-25 RX ADMIN — Medication 1000 UNITS: at 08:43

## 2023-12-25 RX ADMIN — NIFEDIPINE 90 MG: 90 TABLET, EXTENDED RELEASE ORAL at 08:43

## 2023-12-25 RX ADMIN — ATENOLOL 50 MG: 50 TABLET ORAL at 08:43

## 2023-12-25 RX ADMIN — POTASSIUM CHLORIDE 20 MEQ: 1500 TABLET, EXTENDED RELEASE ORAL at 16:13

## 2023-12-25 RX ADMIN — OXYCODONE 10 MG: 5 TABLET ORAL at 21:43

## 2023-12-25 RX ADMIN — LEVOTHYROXINE SODIUM 100 MCG: 0.1 TABLET ORAL at 08:44

## 2023-12-25 RX ADMIN — POTASSIUM CHLORIDE 20 MEQ: 1500 TABLET, EXTENDED RELEASE ORAL at 08:44

## 2023-12-25 RX ADMIN — PIPERACILLIN AND TAZOBACTAM 3375 MG: 3; .375 INJECTION, POWDER, FOR SOLUTION INTRAVENOUS at 21:32

## 2023-12-25 RX ADMIN — OXYCODONE 10 MG: 5 TABLET ORAL at 12:54

## 2023-12-25 RX ADMIN — POTASSIUM CHLORIDE 20 MEQ: 1500 TABLET, EXTENDED RELEASE ORAL at 21:43

## 2023-12-25 RX ADMIN — OXYCODONE 10 MG: 5 TABLET ORAL at 03:45

## 2023-12-25 RX ADMIN — OXYCODONE 10 MG: 5 TABLET ORAL at 08:44

## 2023-12-25 RX ADMIN — PIPERACILLIN AND TAZOBACTAM 3375 MG: 3; .375 INJECTION, POWDER, FOR SOLUTION INTRAVENOUS at 12:59

## 2023-12-25 RX ADMIN — SODIUM CHLORIDE: 9 INJECTION, SOLUTION INTRAVENOUS at 17:35

## 2023-12-25 NOTE — PLAN OF CARE
Problem: Discharge Planning  Goal: Discharge to home or other facility with appropriate resources  Outcome: Progressing     Problem: Safety - Adult  Goal: Free from fall injury  Outcome: Progressing  Flowsheets (Taken 12/23/2023 1854 by Amauri Banegas LPN)  Free From Fall Injury: Instruct family/caregiver on patient safety     Problem: Pain  Goal: Verbalizes/displays adequate comfort level or baseline comfort level  Outcome: Progressing  Flowsheets (Taken 12/23/2023 1854 by Amauri Banegas LPN)  Verbalizes/displays adequate comfort level or baseline comfort level:   Encourage patient to monitor pain and request assistance   Assess pain using appropriate pain scale   Administer analgesics based on type and severity of pain and evaluate response   Implement non-pharmacological measures as appropriate and evaluate response     Problem: Skin/Tissue Integrity - Adult  Goal: Skin integrity remains intact  Outcome: Progressing  Flowsheets (Taken 12/25/2023 1840)  Skin Integrity Remains Intact: Monitor for areas of redness and/or skin breakdown     Problem: Musculoskeletal - Adult  Goal: Return mobility to safest level of function  Outcome: Progressing  Flowsheets (Taken 12/25/2023 1840)  Return Mobility to Safest Level of Function:   Assess patient stability and activity tolerance for standing, transferring and ambulating with or without assistive devices   Assist with transfers and ambulation using safe patient handling equipment as needed     Problem: Gastrointestinal - Adult  Goal: Maintains or returns to baseline bowel function  Outcome: Progressing  Flowsheets (Taken 12/25/2023 1840)  Maintains or returns to baseline bowel function:   Assess bowel function   Administer IV fluids as ordered to ensure adequate hydration   Administer ordered medications as needed     Problem: Genitourinary - Adult  Goal: Absence of urinary retention  Outcome: Progressing  Flowsheets (Taken 12/25/2023 1840)  Absence of urinary

## 2023-12-25 NOTE — PLAN OF CARE
Voiding trail today. Per RN patient will be staying another night. Will plan to transition to Cipro PO on discharge. Continue IV Zosyn while here.     Shweta Loving, LANA - CNP

## 2023-12-26 PROBLEM — G89.18 POST-OPERATIVE PAIN: Status: ACTIVE | Noted: 2023-12-26

## 2023-12-26 PROCEDURE — 6370000000 HC RX 637 (ALT 250 FOR IP): Performed by: PODIATRIST

## 2023-12-26 PROCEDURE — 6360000002 HC RX W HCPCS: Performed by: PODIATRIST

## 2023-12-26 PROCEDURE — 97110 THERAPEUTIC EXERCISES: CPT

## 2023-12-26 PROCEDURE — 99232 SBSQ HOSP IP/OBS MODERATE 35: CPT | Performed by: NURSE PRACTITIONER

## 2023-12-26 PROCEDURE — 97116 GAIT TRAINING THERAPY: CPT

## 2023-12-26 PROCEDURE — 97535 SELF CARE MNGMENT TRAINING: CPT

## 2023-12-26 PROCEDURE — 96372 THER/PROPH/DIAG INJ SC/IM: CPT

## 2023-12-26 PROCEDURE — 6370000000 HC RX 637 (ALT 250 FOR IP): Performed by: PHYSICIAN ASSISTANT

## 2023-12-26 PROCEDURE — 97530 THERAPEUTIC ACTIVITIES: CPT

## 2023-12-26 PROCEDURE — 6370000000 HC RX 637 (ALT 250 FOR IP): Performed by: NURSE PRACTITIONER

## 2023-12-26 PROCEDURE — 1200000000 HC SEMI PRIVATE

## 2023-12-26 PROCEDURE — 6360000002 HC RX W HCPCS: Performed by: NURSE PRACTITIONER

## 2023-12-26 PROCEDURE — 2580000003 HC RX 258: Performed by: PODIATRIST

## 2023-12-26 PROCEDURE — 2580000003 HC RX 258: Performed by: NURSE PRACTITIONER

## 2023-12-26 RX ORDER — FLUCONAZOLE 150 MG/1
150 TABLET ORAL ONCE
Qty: 1 TABLET | Refills: 0 | Status: SHIPPED | OUTPATIENT
Start: 2023-12-26 | End: 2023-12-26

## 2023-12-26 RX ORDER — CIPROFLOXACIN 500 MG/1
500 TABLET, FILM COATED ORAL EVERY 12 HOURS SCHEDULED
Status: DISCONTINUED | OUTPATIENT
Start: 2023-12-26 | End: 2023-12-28 | Stop reason: HOSPADM

## 2023-12-26 RX ORDER — CIPROFLOXACIN 500 MG/1
500 TABLET, FILM COATED ORAL EVERY 12 HOURS SCHEDULED
Qty: 8 TABLET | Refills: 0 | Status: SHIPPED | OUTPATIENT
Start: 2023-12-26 | End: 2023-12-30

## 2023-12-26 RX ADMIN — POTASSIUM CHLORIDE 20 MEQ: 1500 TABLET, EXTENDED RELEASE ORAL at 20:37

## 2023-12-26 RX ADMIN — ENOXAPARIN SODIUM 40 MG: 100 INJECTION SUBCUTANEOUS at 09:26

## 2023-12-26 RX ADMIN — PHENAZOPYRIDINE 200 MG: 200 TABLET ORAL at 13:32

## 2023-12-26 RX ADMIN — PIPERACILLIN AND TAZOBACTAM 3375 MG: 3; .375 INJECTION, POWDER, FOR SOLUTION INTRAVENOUS at 04:00

## 2023-12-26 RX ADMIN — POTASSIUM CHLORIDE 20 MEQ: 1500 TABLET, EXTENDED RELEASE ORAL at 09:26

## 2023-12-26 RX ADMIN — PHENAZOPYRIDINE 200 MG: 200 TABLET ORAL at 09:26

## 2023-12-26 RX ADMIN — OXYCODONE 10 MG: 5 TABLET ORAL at 02:50

## 2023-12-26 RX ADMIN — OXYCODONE 10 MG: 5 TABLET ORAL at 09:25

## 2023-12-26 RX ADMIN — POTASSIUM CHLORIDE 20 MEQ: 1500 TABLET, EXTENDED RELEASE ORAL at 16:59

## 2023-12-26 RX ADMIN — FUROSEMIDE 40 MG: 40 TABLET ORAL at 09:26

## 2023-12-26 RX ADMIN — LEVOTHYROXINE SODIUM 100 MCG: 0.1 TABLET ORAL at 09:26

## 2023-12-26 RX ADMIN — ATENOLOL 50 MG: 50 TABLET ORAL at 09:26

## 2023-12-26 RX ADMIN — POTASSIUM CHLORIDE 20 MEQ: 1500 TABLET, EXTENDED RELEASE ORAL at 13:33

## 2023-12-26 RX ADMIN — ACETAMINOPHEN 650 MG: 325 TABLET ORAL at 20:37

## 2023-12-26 RX ADMIN — SODIUM CHLORIDE, PRESERVATIVE FREE 10 ML: 5 INJECTION INTRAVENOUS at 20:38

## 2023-12-26 RX ADMIN — SODIUM CHLORIDE, PRESERVATIVE FREE 10 ML: 5 INJECTION INTRAVENOUS at 09:25

## 2023-12-26 RX ADMIN — Medication 1000 UNITS: at 09:26

## 2023-12-26 RX ADMIN — PHENAZOPYRIDINE 200 MG: 200 TABLET ORAL at 16:59

## 2023-12-26 RX ADMIN — OXYCODONE HYDROCHLORIDE AND ACETAMINOPHEN 250 MG: 500 TABLET ORAL at 09:26

## 2023-12-26 RX ADMIN — OXYCODONE 5 MG: 5 TABLET ORAL at 13:32

## 2023-12-26 RX ADMIN — NIFEDIPINE 90 MG: 90 TABLET, EXTENDED RELEASE ORAL at 09:26

## 2023-12-26 RX ADMIN — POLYETHYLENE GLYCOL 3350 17 G: 17 POWDER, FOR SOLUTION ORAL at 09:25

## 2023-12-26 RX ADMIN — OXYCODONE 10 MG: 5 TABLET ORAL at 18:52

## 2023-12-26 RX ADMIN — CIPROFLOXACIN 500 MG: 500 TABLET, FILM COATED ORAL at 20:39

## 2023-12-26 NOTE — CARE COORDINATION
Case Management Assessment  Initial Evaluation    Date/Time of Evaluation: 12/26/2023 2:12 PM  Assessment Completed by: Ian Walker RN    If patient is discharged prior to next notation, then this note serves as note for discharge by case management. Patient Name: Bhavin العلي                   YOB: 1960  Diagnosis: Nondisplaced fracture of navicular (scaphoid) of left foot, sequela [S92.255S]  Spontaneous rupture of other tendons, left ankle and foot [M66.872]  Post-op pain [G89.18]  Post-operative pain [G89.18]                   Date / Time: 12/22/2023  6:04 AM  Location: [address] [place] [address]     Patient Admission Status: Inpatient   Readmission Risk Low 0-14, Mod 15-19), High > 20: Readmission Risk Score: 5.6    Current PCP: Usha Simmons MD  PCP verified by CM? Yes    Chart Reviewed: Yes      History Provided by: Patient  Patient Orientation: Alert and Oriented    Patient Cognition: Alert    Hospitalization in the last 30 days (Readmission):  No    If yes, Readmission Assessment in CM Navigator will be completed. Advance Directives:      Code Status: Full Code   Patient's Primary Decision Maker is: Legal Next of Kin (daughter Markie Oliveira)      Discharge Planning:    Patient lives with: Alone Type of Home: House  Primary Care Giver: Self  Patient Support Systems include: Children, Family Members   Current Financial resources: Other (Comment) (commercial insurance)  Current community resources: None  Current services prior to admission: None            Current DME:              Type of Home Care services:  None    ADLS  Prior functional level: Independent in ADLs/IADLs  Current functional level: Other (see comment) (tbd)    Family can provide assistance at DC: Yes  Would you like Case Management to discuss the discharge plan with any other family members/significant others, and if so, who?  No  Plans to Return to Present Housing: Yes  Other Identified Issues/Barriers to RETURNING to current

## 2023-12-26 NOTE — PLAN OF CARE
Problem: Discharge Planning  Goal: Discharge to home or other facility with appropriate resources  12/26/2023 1554 by Oswaldo Santana RN  Outcome: Progressing  Flowsheets (Taken 12/25/2023 2134 by Maribell Olsen RN)  Discharge to home or other facility with appropriate resources: Identify barriers to discharge with patient and caregiver     Problem: Safety - Adult  Goal: Free from fall injury  12/26/2023 1554 by Oswaldo Santana RN  Outcome: Progressing  Flowsheets (Taken 12/23/2023 1854 by Angelica Sharma LPN)  Free From Fall Injury: Instruct family/caregiver on patient safety     Problem: Pain  Goal: Verbalizes/displays adequate comfort level or baseline comfort level  12/26/2023 1554 by Oswaldo Santana RN  Outcome: Progressing  Flowsheets (Taken 12/25/2023 2143 by Nilsa Razo LPN)  Verbalizes/displays adequate comfort level or baseline comfort level: Encourage patient to monitor pain and request assistance     Problem: Skin/Tissue Integrity - Adult  Goal: Skin integrity remains intact  12/26/2023 1554 by Oswaldo Santana RN  Outcome: Progressing  Flowsheets (Taken 12/26/2023 1551)  Skin Integrity Remains Intact: Monitor for areas of redness and/or skin breakdown     Problem: Musculoskeletal - Adult  Goal: Return mobility to safest level of function  Outcome: Progressing  Flowsheets (Taken 12/25/2023 2134 by Maribell Olsen RN)  Return Mobility to Safest Level of Function: Assess patient stability and activity tolerance for standing, transferring and ambulating with or without assistive devices     Problem: Gastrointestinal - Adult  Goal: Maintains or returns to baseline bowel function  Outcome: Progressing  Flowsheets (Taken 12/25/2023 2134 by Maribell Olsen RN)  Maintains or returns to baseline bowel function: Assess bowel function     Problem: Genitourinary - Adult  Goal: Absence of urinary retention  Outcome: Progressing  Flowsheets (Taken 12/25/2023 2134 by Maribell Olsen RN)  Absence of

## 2023-12-26 NOTE — PLAN OF CARE
Problem: Discharge Planning  Goal: Discharge to home or other facility with appropriate resources  12/26/2023 0529 by Vandana Lai LPN  Outcome: Progressing  Flowsheets (Taken 12/25/2023 2134 by Daniel Luz RN)  Discharge to home or other facility with appropriate resources: Identify barriers to discharge with patient and caregiver  Note:  Patient continues to progress with therapy services. Social service working on needs for discharge. Problem: Safety - Adult  Goal: Free from fall injury  12/26/2023 0529 by Vandana Lai LPN  Outcome: Suzan Brandon (Taken 12/23/2023 1854 by Piero Mcpherson LPN)  Free From Fall Injury: Instruct family/caregiver on patient safety  Note: No falls sustained at this time. Patient alert to call light and uses appropriately to alert staff to needs. Bed/chair alarms in use. Problem: Pain  Goal: Verbalizes/displays adequate comfort level or baseline comfort level  12/26/2023 0529 by Vandana Lai LPN  Outcome: Progressing  Flowsheets (Taken 12/25/2023 2143)  Verbalizes/displays adequate comfort level or baseline comfort level: Encourage patient to monitor pain and request assistance     Problem: Skin/Tissue Integrity - Adult  Goal: Skin integrity remains intact  12/26/2023 0529 by Vandana Lai LPN  Outcome: Progressing  Flowsheets (Taken 12/25/2023 2134 by Daniel Luz RN)  Skin Integrity Remains Intact: Monitor for areas of redness and/or skin breakdown  Note: No new skin issues noted this shift. Care plan reviewed with patient. Patient verbalize understanding of the plan of care and contribute to goal setting.

## 2023-12-27 ENCOUNTER — TELEPHONE (OUTPATIENT)
Dept: FAMILY MEDICINE CLINIC | Age: 63
End: 2023-12-27

## 2023-12-27 PROCEDURE — 6360000002 HC RX W HCPCS: Performed by: PODIATRIST

## 2023-12-27 PROCEDURE — 2580000003 HC RX 258: Performed by: PODIATRIST

## 2023-12-27 PROCEDURE — 97535 SELF CARE MNGMENT TRAINING: CPT

## 2023-12-27 PROCEDURE — 6370000000 HC RX 637 (ALT 250 FOR IP): Performed by: NURSE PRACTITIONER

## 2023-12-27 PROCEDURE — 6370000000 HC RX 637 (ALT 250 FOR IP): Performed by: PHYSICIAN ASSISTANT

## 2023-12-27 PROCEDURE — 6370000000 HC RX 637 (ALT 250 FOR IP): Performed by: PODIATRIST

## 2023-12-27 PROCEDURE — 1200000000 HC SEMI PRIVATE

## 2023-12-27 RX ADMIN — SODIUM CHLORIDE, PRESERVATIVE FREE 10 ML: 5 INJECTION INTRAVENOUS at 09:11

## 2023-12-27 RX ADMIN — PHENAZOPYRIDINE 200 MG: 200 TABLET ORAL at 14:33

## 2023-12-27 RX ADMIN — OXYCODONE 10 MG: 5 TABLET ORAL at 09:12

## 2023-12-27 RX ADMIN — FUROSEMIDE 40 MG: 40 TABLET ORAL at 09:11

## 2023-12-27 RX ADMIN — ENOXAPARIN SODIUM 40 MG: 100 INJECTION SUBCUTANEOUS at 09:11

## 2023-12-27 RX ADMIN — OXYCODONE HYDROCHLORIDE AND ACETAMINOPHEN 250 MG: 500 TABLET ORAL at 09:11

## 2023-12-27 RX ADMIN — PHENAZOPYRIDINE 200 MG: 200 TABLET ORAL at 09:11

## 2023-12-27 RX ADMIN — POTASSIUM CHLORIDE 20 MEQ: 1500 TABLET, EXTENDED RELEASE ORAL at 09:11

## 2023-12-27 RX ADMIN — CIPROFLOXACIN 500 MG: 500 TABLET, FILM COATED ORAL at 22:20

## 2023-12-27 RX ADMIN — ATENOLOL 50 MG: 50 TABLET ORAL at 09:11

## 2023-12-27 RX ADMIN — ACETAMINOPHEN 650 MG: 325 TABLET ORAL at 22:18

## 2023-12-27 RX ADMIN — LEVOTHYROXINE SODIUM 100 MCG: 0.1 TABLET ORAL at 09:11

## 2023-12-27 RX ADMIN — OXYCODONE 5 MG: 5 TABLET ORAL at 22:18

## 2023-12-27 RX ADMIN — OXYCODONE 10 MG: 5 TABLET ORAL at 17:23

## 2023-12-27 RX ADMIN — POTASSIUM CHLORIDE 20 MEQ: 1500 TABLET, EXTENDED RELEASE ORAL at 14:33

## 2023-12-27 RX ADMIN — POTASSIUM CHLORIDE 20 MEQ: 1500 TABLET, EXTENDED RELEASE ORAL at 22:18

## 2023-12-27 RX ADMIN — CIPROFLOXACIN 500 MG: 500 TABLET, FILM COATED ORAL at 09:12

## 2023-12-27 RX ADMIN — SODIUM CHLORIDE, PRESERVATIVE FREE 10 ML: 5 INJECTION INTRAVENOUS at 22:19

## 2023-12-27 RX ADMIN — POTASSIUM CHLORIDE 20 MEQ: 1500 TABLET, EXTENDED RELEASE ORAL at 17:22

## 2023-12-27 RX ADMIN — NIFEDIPINE 90 MG: 90 TABLET, EXTENDED RELEASE ORAL at 09:11

## 2023-12-27 RX ADMIN — Medication 1000 UNITS: at 09:11

## 2023-12-27 RX ADMIN — PHENAZOPYRIDINE 200 MG: 200 TABLET ORAL at 17:23

## 2023-12-27 NOTE — TELEPHONE ENCOUNTER
Roxana from [facility] called. Says pt is about to be d/c and needs orders for PT, OT and nursing.  She is wondering if  will following her ?   [phone]

## 2023-12-27 NOTE — PLAN OF CARE
Problem: Discharge Planning  Goal: Discharge to home or other facility with appropriate resources  12/26/2023 2234 by Carlos Fam RN  Outcome: Progressing  Flowsheets (Taken 12/26/2023 2234)  Discharge to home or other facility with appropriate resources:   Identify barriers to discharge with patient and caregiver   Arrange for needed discharge resources and transportation as appropriate   Identify discharge learning needs (meds, wound care, etc)     Problem: Safety - Adult  Goal: Free from fall injury  12/26/2023 2234 by Carlos Fam RN  Outcome: Progressing  Flowsheets (Taken 12/26/2023 2234)  Free From Fall Injury: Instruct family/caregiver on patient safety     Problem: Pain  Goal: Verbalizes/displays adequate comfort level or baseline comfort level  12/26/2023 2234 by Carlos Fam RN  Outcome: Progressing  Flowsheets (Taken 12/26/2023 2234)  Verbalizes/displays adequate comfort level or baseline comfort level:   Encourage patient to monitor pain and request assistance   Assess pain using appropriate pain scale   Administer analgesics based on type and severity of pain and evaluate response   Implement non-pharmacological measures as appropriate and evaluate response     Problem: Skin/Tissue Integrity - Adult  Goal: Skin integrity remains intact  12/26/2023 2234 by Carlos Fam RN  Outcome: Progressing  Flowsheets (Taken 12/26/2023 2234)  Skin Integrity Remains Intact: Monitor for areas of redness and/or skin breakdown     Problem: Musculoskeletal - Adult  Goal: Return mobility to safest level of function  12/26/2023 2234 by Carlos Fam RN  Outcome: Progressing  Flowsheets (Taken 12/26/2023 2234)  Return Mobility to Safest Level of Function:   Assess patient stability and activity tolerance for standing, transferring and ambulating with or without assistive devices   Assist with transfers and ambulation using safe patient handling equipment as needed   Ensure adequate protection for

## 2023-12-27 NOTE — CARE COORDINATION
12/27/23, 8:32 AM EST    DISCHARGE ON GOING EVALUATION    1301 Crunched day: 1  Location: -23/023-A Reason for admit: Nondisplaced fracture of navicular (scaphoid) of left foot, sequela [S92.255S]  Spontaneous rupture of other tendons, left ankle and foot [M66.872]  Post-op pain [G89.18]  Post-operative pain [G89.18]   Procedure:   12/22 OR: Total Talus and Navicular replacement w titanium implant- L foot, Arthrodesis of 1st, 2nd, and 3rd Cuniform to Navicular Implant with Staples Fixation- L foot arthrodesis Subtalar Joint w Talus Component Implant- L foot. Barriers to Discharge: POD 5, NWB to LLE, IV Zosyn now switched to po Cipro. Pyridium. Cont PT/OT. PCP: Brian Carr MD  Readmission Risk Score: 5.6%  Patient Goals/Plan/Treatment Preferences: plans home with daughter; requests [facility]. W/C ordered by podiatry; documentation absent and messaged on-call podiatrist to have MD edit orders for new W/C.        1000- referral made to [facility]; spoke with [facility]. Faxed required information. [facility] needs RN added to the [facility] orders. We also need req documentation for pt to get the wheelchair. Notified early in morning - await updated orders from podiatry Dr Ad Pappas. 12/27/23, 10:56 AM EST    Patient goals/plan/ treatment preferences discussed by  and . Patient goals/plan/ treatment preferences reviewed with patient/ family. Patient/ family verbalize understanding of discharge plan and are in agreement with goal/plan/treatment preferences. Understanding was demonstrated using the teach back method. AVS provided by RN at time of discharge, which includes all necessary medical information pertaining to the patients current course of illness, treatment, post-discharge goals of care, and treatment preferences.      Services At/After Discharge: Home Health, Nursing service, OT, and PT       IMM Letter  IMM Letter given to Patient/Family/Significant

## 2023-12-27 NOTE — CARE COORDINATION
12/27/23, 10:12 AM EST    DISCHARGE PLANNING EVALUATION    Discharge needs discussed with care manager and RN, care manager has made referral to [facility].

## 2023-12-27 NOTE — TELEPHONE ENCOUNTER
I can't order the Home Health as I am not doing the face to face. Her hospital doctors should order it but I can follow her.

## 2023-12-28 VITALS
DIASTOLIC BLOOD PRESSURE: 59 MMHG | TEMPERATURE: 98.6 F | SYSTOLIC BLOOD PRESSURE: 119 MMHG | BODY MASS INDEX: 27.14 KG/M2 | WEIGHT: 159 LBS | HEART RATE: 65 BPM | HEIGHT: 64 IN | OXYGEN SATURATION: 95 % | RESPIRATION RATE: 16 BRPM

## 2023-12-28 PROBLEM — S92.255S: Status: ACTIVE | Noted: 2023-12-28

## 2023-12-28 PROBLEM — G89.18 POST-OP PAIN: Status: RESOLVED | Noted: 2023-12-22 | Resolved: 2023-12-28

## 2023-12-28 PROCEDURE — 97110 THERAPEUTIC EXERCISES: CPT

## 2023-12-28 PROCEDURE — 6370000000 HC RX 637 (ALT 250 FOR IP): Performed by: PODIATRIST

## 2023-12-28 PROCEDURE — 97542 WHEELCHAIR MNGMENT TRAINING: CPT

## 2023-12-28 PROCEDURE — 97530 THERAPEUTIC ACTIVITIES: CPT

## 2023-12-28 PROCEDURE — 6370000000 HC RX 637 (ALT 250 FOR IP): Performed by: NURSE PRACTITIONER

## 2023-12-28 PROCEDURE — 6370000000 HC RX 637 (ALT 250 FOR IP): Performed by: PHYSICIAN ASSISTANT

## 2023-12-28 PROCEDURE — 6360000002 HC RX W HCPCS: Performed by: PODIATRIST

## 2023-12-28 PROCEDURE — 97116 GAIT TRAINING THERAPY: CPT

## 2023-12-28 RX ADMIN — ATENOLOL 50 MG: 50 TABLET ORAL at 09:22

## 2023-12-28 RX ADMIN — PHENAZOPYRIDINE 200 MG: 200 TABLET ORAL at 13:29

## 2023-12-28 RX ADMIN — ENOXAPARIN SODIUM 40 MG: 100 INJECTION SUBCUTANEOUS at 09:21

## 2023-12-28 RX ADMIN — LEVOTHYROXINE SODIUM 100 MCG: 0.1 TABLET ORAL at 09:22

## 2023-12-28 RX ADMIN — OXYCODONE 10 MG: 5 TABLET ORAL at 14:40

## 2023-12-28 RX ADMIN — POTASSIUM CHLORIDE 20 MEQ: 1500 TABLET, EXTENDED RELEASE ORAL at 13:29

## 2023-12-28 RX ADMIN — NIFEDIPINE 90 MG: 90 TABLET, EXTENDED RELEASE ORAL at 09:22

## 2023-12-28 RX ADMIN — Medication 1000 UNITS: at 09:21

## 2023-12-28 RX ADMIN — ACETAMINOPHEN 650 MG: 325 TABLET ORAL at 14:40

## 2023-12-28 RX ADMIN — PHENAZOPYRIDINE 200 MG: 200 TABLET ORAL at 16:41

## 2023-12-28 RX ADMIN — CIPROFLOXACIN 500 MG: 500 TABLET, FILM COATED ORAL at 09:21

## 2023-12-28 RX ADMIN — PHENAZOPYRIDINE 200 MG: 200 TABLET ORAL at 09:21

## 2023-12-28 RX ADMIN — POTASSIUM CHLORIDE 20 MEQ: 1500 TABLET, EXTENDED RELEASE ORAL at 16:41

## 2023-12-28 RX ADMIN — OXYCODONE 10 MG: 5 TABLET ORAL at 09:26

## 2023-12-28 RX ADMIN — FUROSEMIDE 40 MG: 40 TABLET ORAL at 09:22

## 2023-12-28 RX ADMIN — OXYCODONE HYDROCHLORIDE AND ACETAMINOPHEN 250 MG: 500 TABLET ORAL at 09:22

## 2023-12-28 RX ADMIN — POTASSIUM CHLORIDE 20 MEQ: 1500 TABLET, EXTENDED RELEASE ORAL at 09:21

## 2023-12-28 RX ADMIN — OXYCODONE 5 MG: 5 TABLET ORAL at 04:55

## 2023-12-28 NOTE — DISCHARGE SUMMARY
taking these medications       fluconazole (DIFLUCAN) 150 MG tablet Comments:   Reason for Stopping:             Activity: Non weight-bearing to LLE. Diet: regular diet  Wound Care: Leave cast clean, dry, intact to LLE    Follow-up with Dr. Rock Kauffman next week.      Signed:  Graciela Slade DPM, PGY-3  Podiatric Surgical Resident  12/28/2023  10:43 AM

## 2023-12-28 NOTE — ADT AUTH CERT
DATE: 12/27          RELEVANT BASELINES: (lab values, vitals, o2 amount/delivery, etc.) RA, IPTA        PERTINENT UPDATES:  POD 5  NWB to LLE  PO Cipro. Pyridium. Cont PT/OT  Pain Control         VITALS: 98.8 (37.1) 16 78 140/70   99% RA         ABNL/PERTINENT LABS/RADIOLOGY/DIAGNOSTIC STUDIES:  No Labs         PHYSICAL EXAM:  LLE below the knee cast intact and well-maintained. No apparent strikethrough noted. Vascular: Distal tips of the left toes are warm to touch, CFT less than 5 seconds. Dermatologic: skin to distal nenita of left toes is clean and dry     Neurologic: light touch and protective sensation grossly intact to distal digits LLE     Musculoskeletal: muscular strength and range of motion testing deferred due to presence of below-knee cast LLE        MD CONSULTS/ASSESSMENT AND PLAN:  Per Podiatry  Patient seen bedside today on behalf of Dr. Gurinder Shankar. Patient appeared pleasant, was oriented to person, place and time and in no acute distress. Patient is 5 days s/p total talus and navicular replacement with titanium implant-left foot, arthrodesis of first, second, and third cuneiform to navicular implant with stable fixation-left foot, arthrodesis subtalar joint with talus component implant-left foot, gastrocnemius recession, left. Tendo Achilles lengthening left. Patient relates her pain is a 6/10 today but states that she did not have a dose of morphine since yesterday. Patient is in good spirits and optimistic about her progress. She relates that she has a few stairs to get into her home, and is unsure of how she will be able to get in. She currently denies any constitutional symptoms of nausea, fever, vomiting, chills, or shortness of breath. No other pedal concerns at this time. Assessment/Plan:   1. Nondisplaced fracture of left navicular, spontaneous rupture of left lower extremity tendons  a.          Status post surgical intervention with podiatry team.

## 2023-12-28 NOTE — PLAN OF CARE
Problem: Discharge Planning  Goal: Discharge to home or other facility with appropriate resources  Outcome: Progressing  Flowsheets (Taken 12/27/2023 2027)  Discharge to home or other facility with appropriate resources:   Identify barriers to discharge with patient and caregiver   Identify discharge learning needs (meds, wound care, etc)     Problem: Safety - Adult  Goal: Free from fall injury  Outcome: Progressing  Flowsheets (Taken 12/27/2023 2027)  Free From Fall Injury: Instruct family/caregiver on patient safety     Problem: Pain  Goal: Verbalizes/displays adequate comfort level or baseline comfort level  Outcome: Progressing  Flowsheets (Taken 12/27/2023 2027)  Verbalizes/displays adequate comfort level or baseline comfort level:   Encourage patient to monitor pain and request assistance   Assess pain using appropriate pain scale   Administer analgesics based on type and severity of pain and evaluate response   Implement non-pharmacological measures as appropriate and evaluate response     Problem: Skin/Tissue Integrity - Adult  Goal: Skin integrity remains intact  Outcome: Progressing  Flowsheets (Taken 12/27/2023 2027)  Skin Integrity Remains Intact: Monitor for areas of redness and/or skin breakdown     Problem: Musculoskeletal - Adult  Goal: Return mobility to safest level of function  Outcome: Progressing  Flowsheets (Taken 12/27/2023 2027)  Return Mobility to Safest Level of Function:   Assess patient stability and activity tolerance for standing, transferring and ambulating with or without assistive devices   Assist with transfers and ambulation using safe patient handling equipment as needed     Problem: Skin/Tissue Integrity  Goal: Absence of new skin breakdown  Description: 1. Monitor for areas of redness and/or skin breakdown  2. Assess vascular access sites hourly  3. Every 4-6 hours minimum:  Change oxygen saturation probe site  4.   Every 4-6 hours:  If on nasal continuous positive airway

## 2023-12-29 ENCOUNTER — CARE COORDINATION (OUTPATIENT)
Dept: FAMILY MEDICINE CLINIC | Age: 63
End: 2023-12-29

## 2023-12-29 NOTE — CARE COORDINATION
Care Transitions Initial Follow Up Call    Call within 2 business days of discharge: Yes     Patient: Milli Back Patient : 1960 MRN: [id number]    Last Discharge Facility       Date Complaint Diagnosis Description Type Department Provider    23  Post-operative pain Admission (Discharged) Faisal Hull, [facility]            RARS: Readmission Risk Score: 5.6       Spoke with: Milli    Discharge department/facility: [facility]    Non-face-to-face services provided:  Scheduled appointment with PCP-yes  Scheduled appointment with Specialist-yes  Obtained and reviewed discharge summary and/or continuity of care documents  Reviewed and followed up on pending diagnostic tests and treatments-yes  Communication with home health agencies or other community services the patient is currently using-yes  Communication with specialists who will assume or re-assume care of the patient's system-specific problems-yes  Education of patient/family/caregiver/guardian to support self-management-yes  Assessment and support for treatment adherence and medication management-yes  Establishment or re-establishment of referrals-yes  Assistance in accessing community resources-na    Follow Up  Future Appointments   Date Time Provider Department Center   3/12/2024 10:00 AM Gucci Ellison MD [facility] [facility] [place]   2024 10:00 AM Neidhardt, Hector, MD [facility]     Spoke to Milli for follow up post hospital discharge for foot surgery. Does have some pain, does not walk on this foot yet-wheelchair. Lives alone, but much family support. [facility] HH referral made according to note by Care Coordinator in house. Milli has not heard from them yet, realizes Holiday weekend. Will notify HH. Milli does not want to make follow up ca ll with Dr. Johnson at this time-saw pre-op and will see her Podiatrist Surgeon  Explained Care Coordination Program and she is agreeable to follow up calls.Encouraged her to

## 2023-12-29 NOTE — CARE COORDINATION
Confirmed with [facility] that their will be a visit tomorrow. They just spoke to Siobhan Bell within past half hour-after my phone call with her earlier.

## 2024-01-04 ENCOUNTER — CARE COORDINATION (OUTPATIENT)
Dept: FAMILY MEDICINE CLINIC | Age: 64
End: 2024-01-04

## 2024-01-04 NOTE — CARE COORDINATION
Milli is doing well. She is going to Dr Menchaca today. She said therapy is going good. She said she is managing the pain and taking pain medication only when she really needs it. She has no questions at this time or any additional needs. I instructed her to call us if she needs anything. She agreed and thanked me for calling to check on her.

## 2024-01-12 ENCOUNTER — CARE COORDINATION (OUTPATIENT)
Dept: FAMILY MEDICINE CLINIC | Age: 64
End: 2024-01-12

## 2024-01-12 NOTE — CARE COORDINATION
Spoke with Milli week #2 post hospital discharge for foot surgery. Say Dr. Menchaca last week and he removed cast. Today suture removal. She will see him again in follow up next Friday. [facility] PT comes 2x week for therapy. She states she is doing well, no issues to address. Will follow next week. Encouraged Milli to call with any questions or concerns.

## 2024-01-14 DIAGNOSIS — Z87.891 FORMER SMOKER: Primary | ICD-10-CM

## 2024-01-15 ENCOUNTER — TELEPHONE (OUTPATIENT)
Dept: FAMILY MEDICINE CLINIC | Age: 64
End: 2024-01-15

## 2024-01-15 NOTE — TELEPHONE ENCOUNTER
Attempted to get pa from portal.  It stated we didn't have right insurance.  Left message for patient that we needed a copy of new insurance.MOM to return call to office

## 2024-01-16 NOTE — TELEPHONE ENCOUNTER
Called patient again and she doesn't want to proceed with lung screen at this time.  Will re-evaul next year or later.  Will let us know if and when she wants to do.

## 2024-01-18 ENCOUNTER — CARE COORDINATION (OUTPATIENT)
Dept: FAMILY MEDICINE CLINIC | Age: 64
End: 2024-01-18

## 2024-01-18 NOTE — CARE COORDINATION
Spoke to Milli week #3 follow up for post hospital discharge regarding foot surgery. Still having services for PT from [facility]. States does what the doctor tells her to do. Feels she is healing well. Encouraged to call with any questions or concerns.

## 2024-01-19 DIAGNOSIS — I10 ESSENTIAL HYPERTENSION: ICD-10-CM

## 2024-01-19 RX ORDER — NIFEDIPINE 90 MG/1
TABLET, FILM COATED, EXTENDED RELEASE ORAL
Qty: 90 TABLET | Refills: 4 | Status: SHIPPED | OUTPATIENT
Start: 2024-01-19

## 2024-02-01 ENCOUNTER — CARE COORDINATION (OUTPATIENT)
Dept: FAMILY MEDICINE CLINIC | Age: 64
End: 2024-02-01

## 2024-02-01 NOTE — CARE COORDINATION
Spoke with Milli-final call for hospital post discharge for foot surgery. Completed PT with [facility]. Wanting to know when she is to start outpatient therapy at [facility]-she has not received a call from them. Call made to outpatient PT-they do not have an order from Dr. Menchaca for this. Notified Milli of above and advised her to all Dr. Menchaca's office for verification of follow up therapy. She verbalized understanding. Encouraged Milli to call with any other questions or concerns.

## 2024-02-06 NOTE — TELEPHONE ENCOUNTER
Date of last visit:  8/1/2023  Date of next visit:  Visit date not found    Requested Prescriptions     Pending Prescriptions Disp Refills    potassium chloride (KLOR-CON M20) 20 MEQ extended release tablet [Pharmacy Med Name: Klor-Con M20 20 MEQ Oral Tablet Extended Release] 270 tablet 0     Sig: TAKE 1 TABLET BY MOUTH THREE TIMES DAILY

## 2024-02-07 RX ORDER — POTASSIUM CHLORIDE 20 MEQ/1
TABLET, EXTENDED RELEASE ORAL
Qty: 270 TABLET | Refills: 0 | Status: SHIPPED | OUTPATIENT
Start: 2024-02-07

## 2024-03-07 LAB
ANION GAP SERPL CALCULATED.3IONS-SCNC: 8 MMOL/L (ref 4–12)
BUN BLDV-MCNC: 16 MG/DL (ref 7–20)
CALCIUM SERPL-MCNC: 9.1 MG/DL (ref 8.8–10.5)
CHLORIDE BLD-SCNC: 104 MEQ/L (ref 101–111)
CO2: 26 MEQ/L (ref 21–32)
CREAT SERPL-MCNC: 0.8 MG/DL (ref 0.6–1.3)
CREATININE CLEARANCE: >60
GLUCOSE: 75 MG/DL (ref 70–110)
POTASSIUM SERPL-SCNC: 3.9 MEQ/L (ref 3.6–5)
SODIUM BLD-SCNC: 138 MEQ/L (ref 135–145)

## 2024-03-12 ENCOUNTER — OFFICE VISIT (OUTPATIENT)
Dept: NEPHROLOGY | Age: 64
End: 2024-03-12
Payer: COMMERCIAL

## 2024-03-12 VITALS
HEIGHT: 65 IN | OXYGEN SATURATION: 98 % | BODY MASS INDEX: 26.16 KG/M2 | WEIGHT: 157 LBS | DIASTOLIC BLOOD PRESSURE: 72 MMHG | HEART RATE: 63 BPM | SYSTOLIC BLOOD PRESSURE: 142 MMHG

## 2024-03-12 DIAGNOSIS — I10 PRIMARY HYPERTENSION: ICD-10-CM

## 2024-03-12 DIAGNOSIS — E87.6 HYPOKALEMIA: Primary | ICD-10-CM

## 2024-03-12 PROCEDURE — 3078F DIAST BP <80 MM HG: CPT | Performed by: INTERNAL MEDICINE

## 2024-03-12 PROCEDURE — 3077F SYST BP >= 140 MM HG: CPT | Performed by: INTERNAL MEDICINE

## 2024-03-12 PROCEDURE — 1123F ACP DISCUSS/DSCN MKR DOCD: CPT | Performed by: INTERNAL MEDICINE

## 2024-03-12 PROCEDURE — 99213 OFFICE O/P EST LOW 20 MIN: CPT | Performed by: INTERNAL MEDICINE

## 2024-03-12 NOTE — PROGRESS NOTES
taking lasix 40 mg daily. She will call when she gets home.     Orders Placed This Encounter   Procedures    Protein / creatinine ratio, urine    Basic Metabolic Panel     Return in about 1 year (around 3/12/2025).      Gucci Ellison MD  [facility]

## 2024-03-13 ENCOUNTER — TELEPHONE (OUTPATIENT)
Dept: NEPHROLOGY | Age: 64
End: 2024-03-13

## 2024-03-13 NOTE — TELEPHONE ENCOUNTER
Pt called and spoke to Abrahan regarding a medication that she was/was not taking. Patient was confused and not sure which medication, due to it was not on her list.    Spoke to pt, she stated that she is not taking lasix, she thought she was.

## 2024-06-27 RX ORDER — POTASSIUM CHLORIDE 20 MEQ/1
TABLET, EXTENDED RELEASE ORAL
Qty: 360 TABLET | Refills: 3 | Status: SHIPPED | OUTPATIENT
Start: 2024-06-27

## 2024-06-27 NOTE — TELEPHONE ENCOUNTER
Date of last visit:  8/1/2023  Date of next visit:  8/6/2024    Requested Prescriptions     Pending Prescriptions Disp Refills    potassium chloride (KLOR-CON M) 20 MEQ extended release tablet [Pharmacy Med Name: Potassium Chloride Carol ER 20 MEQ Oral Tablet Extended Release] 360 tablet 1     Sig: Take 1 tablet by mouth 4 times daily

## 2024-07-25 RX ORDER — ATENOLOL 50 MG/1
50 TABLET ORAL DAILY
Qty: 90 TABLET | Refills: 2 | Status: SHIPPED | OUTPATIENT
Start: 2024-07-25

## 2024-08-06 ENCOUNTER — OFFICE VISIT (OUTPATIENT)
Dept: FAMILY MEDICINE CLINIC | Age: 64
End: 2024-08-06

## 2024-08-06 VITALS
HEART RATE: 74 BPM | HEIGHT: 64 IN | RESPIRATION RATE: 16 BRPM | WEIGHT: 157.38 LBS | BODY MASS INDEX: 26.87 KG/M2 | SYSTOLIC BLOOD PRESSURE: 136 MMHG | DIASTOLIC BLOOD PRESSURE: 60 MMHG

## 2024-08-06 DIAGNOSIS — I10 ESSENTIAL HYPERTENSION: ICD-10-CM

## 2024-08-06 DIAGNOSIS — Z00.00 WELL ADULT EXAM: Primary | ICD-10-CM

## 2024-08-06 DIAGNOSIS — E03.9 ACQUIRED HYPOTHYROIDISM: ICD-10-CM

## 2024-08-06 LAB — TSH SERPL DL<=0.05 MIU/L-ACNC: 0.7 MCIU/ML (ref 0.49–4.67)

## 2024-08-06 PROCEDURE — 99397 PER PM REEVAL EST PAT 65+ YR: CPT | Performed by: FAMILY MEDICINE

## 2024-08-06 SDOH — ECONOMIC STABILITY: FOOD INSECURITY: WITHIN THE PAST 12 MONTHS, THE FOOD YOU BOUGHT JUST DIDN'T LAST AND YOU DIDN'T HAVE MONEY TO GET MORE.: NEVER TRUE

## 2024-08-06 SDOH — ECONOMIC STABILITY: FOOD INSECURITY: WITHIN THE PAST 12 MONTHS, YOU WORRIED THAT YOUR FOOD WOULD RUN OUT BEFORE YOU GOT MONEY TO BUY MORE.: NEVER TRUE

## 2024-08-06 SDOH — ECONOMIC STABILITY: INCOME INSECURITY: HOW HARD IS IT FOR YOU TO PAY FOR THE VERY BASICS LIKE FOOD, HOUSING, MEDICAL CARE, AND HEATING?: NOT HARD AT ALL

## 2024-08-06 ASSESSMENT — PATIENT HEALTH QUESTIONNAIRE - PHQ9
SUM OF ALL RESPONSES TO PHQ QUESTIONS 1-9: 0
SUM OF ALL RESPONSES TO PHQ QUESTIONS 1-9: 0
2. FEELING DOWN, DEPRESSED OR HOPELESS: NOT AT ALL
SUM OF ALL RESPONSES TO PHQ QUESTIONS 1-9: 0
1. LITTLE INTEREST OR PLEASURE IN DOING THINGS: NOT AT ALL
SUM OF ALL RESPONSES TO PHQ9 QUESTIONS 1 & 2: 0
SUM OF ALL RESPONSES TO PHQ QUESTIONS 1-9: 0

## 2024-08-06 ASSESSMENT — ENCOUNTER SYMPTOMS
CONSTIPATION: 0
SINUS PRESSURE: 0
SHORTNESS OF BREATH: 0

## 2024-08-06 NOTE — PATIENT INSTRUCTIONS
if you could benefit from screening, first find out if you are at high risk for lung cancer. Your doctor can help you decide your lung cancer risk.  What are the risks of screening?  CT screening for lung cancer isn't perfect. It can show an abnormal result when it turns out there wasn't any cancer. This is called a false-positive result. This means you may need more tests to make sure you don't have cancer. These tests can be harmful and cause a lot of worry.  These tests may include more CT scans and invasive testing like a lung biopsy. In a biopsy, the doctor takes a sample of tissue from inside your lung so it can be looked at under a microscope. A biopsy is the only way to tell if you have lung cancer. If the biopsy finds cancer, you and your doctor will have to decide how or whether to treat it.  Some lung cancers found on CT scans are harmless and would not have caused a problem if they had not been found through screening. But because doctors can't tell which ones will turn out to be harmless, most will be treated. This means that you may get treatment--including surgery, radiation, or chemotherapy--that you don't need.  There is a risk of damage to cells or tissue from being exposed to radiation, including the small amounts used in CTs, X-rays, and other medical tests. Over time, exposure to radiation may cause cancer and other health problems. But in most cases, the risk of getting cancer from being exposed to small amounts of radiation is low. It's not a reason to avoid these tests for most people.  What are the benefits of screening?  Your scan may be normal (negative).  For some people who are at higher risk, screening lowers the chance of dying of lung cancer. How much and how long you smoked helps to determine your risk level. Screening can find some cancers early, when treatment may be more likely to work.  What happens after screening?  The results of your CT scan will be sent to your doctor.

## 2024-08-06 NOTE — PROGRESS NOTES
Milli Back (:  1960) is a 63 y.o. female,Established patient, here for evaluation of the following chief complaint(s):  Wellness Program      Assessment & Plan    Diagnosis Orders   1. Well adult exam        2. Essential hypertension        3. Acquired hypothyroidism  TSH         Do the non fasting TSH blood test for the thyroid soon  Let me know if we can order the mammogram  See me in a year    Subjective   HPI  Well Adult Physical   Patient here for a comprehensive physical exam.The patient reports no problems. She does not want to do the screening CT lung. She did not want the mammogram  Do you take any herbs or supplements that were not prescribed by a doctor? yes Are you taking calcium supplements? yes Are you taking aspirin daily? no   History:  She sees a GYN    Review of Systems   Constitutional:  Negative for fatigue.   HENT:  Negative for sinus pressure.    Eyes:  Negative for visual disturbance.   Respiratory:  Negative for shortness of breath.    Cardiovascular:  Negative for chest pain.   Gastrointestinal:  Negative for constipation.   Genitourinary: Negative.    Musculoskeletal:  Negative for arthralgias.   Skin:  Negative for rash.   Neurological:  Negative for headaches.   The patient's medications, allergies, past medical problems, surgical, social, and family histories were reviewed and updated as needed.         Objective   Physical Exam  Constitutional:       General: She is not in acute distress.     Appearance: She is well-developed.   HENT:      Head: Normocephalic and atraumatic.      Right Ear: External ear normal.      Left Ear: External ear normal.      Nose: Nose normal.      Mouth/Throat:      Pharynx: No oropharyngeal exudate.   Eyes:      General: No scleral icterus.     Conjunctiva/sclera: Conjunctivae normal.   Neck:      Thyroid: No thyromegaly.      Vascular: No carotid bruit.      Trachea: No tracheal deviation.   Cardiovascular:      Rate and Rhythm: Normal

## 2024-08-12 ENCOUNTER — TELEPHONE (OUTPATIENT)
Dept: FAMILY MEDICINE CLINIC | Age: 64
End: 2024-08-12

## 2024-08-12 NOTE — TELEPHONE ENCOUNTER
----- Message from Dr. Bear Johnson MD sent at 8/11/2024  9:40 PM EDT -----  Let her know the thyroid lab was fine. Does she need a RF sent?

## 2024-09-06 DIAGNOSIS — E03.9 ACQUIRED HYPOTHYROIDISM: ICD-10-CM

## 2024-09-06 RX ORDER — LEVOTHYROXINE SODIUM 100 MCG
100 TABLET ORAL DAILY
Qty: 90 TABLET | Refills: 3 | Status: SHIPPED | OUTPATIENT
Start: 2024-09-06

## 2024-09-06 NOTE — TELEPHONE ENCOUNTER
Date of last visit:  8/6/2024  Date of next visit:  Visit date not found    Requested Prescriptions     Pending Prescriptions Disp Refills    SYNTHROID 100 MCG tablet [Pharmacy Med Name: Synthroid 100 MCG Oral Tablet] 90 tablet 0     Sig: Take 1 tablet by mouth once daily

## 2024-12-17 ENCOUNTER — OFFICE VISIT (OUTPATIENT)
Dept: FAMILY MEDICINE CLINIC | Age: 64
End: 2024-12-17

## 2024-12-17 VITALS
RESPIRATION RATE: 14 BRPM | SYSTOLIC BLOOD PRESSURE: 132 MMHG | HEIGHT: 64 IN | WEIGHT: 156.5 LBS | BODY MASS INDEX: 26.72 KG/M2 | DIASTOLIC BLOOD PRESSURE: 74 MMHG | HEART RATE: 76 BPM

## 2024-12-17 DIAGNOSIS — R04.0 EPISTAXIS: Primary | ICD-10-CM

## 2024-12-17 PROCEDURE — 1123F ACP DISCUSS/DSCN MKR DOCD: CPT | Performed by: FAMILY MEDICINE

## 2024-12-17 PROCEDURE — 99213 OFFICE O/P EST LOW 20 MIN: CPT | Performed by: FAMILY MEDICINE

## 2024-12-17 ASSESSMENT — ENCOUNTER SYMPTOMS
CONSTIPATION: 0
SINUS PRESSURE: 0
SHORTNESS OF BREATH: 0

## 2024-12-17 NOTE — PROGRESS NOTES
weight 71 kg (156 lb 8 oz), not currently breastfeeding.          An electronic signature was used to authenticate this note.    --Bear Johnson MD

## 2024-12-17 NOTE — PATIENT INSTRUCTIONS
Patient Education        Nosebleeds: Care Instructions  Overview     Nosebleeds are common, especially if you have colds or allergies. Many things can cause a nosebleed.  Some nosebleeds stop on their own with pressure. Others need packing. Some get cauterized (sealed). If you have gauze or other packing materials in your nose, you will need to follow up with your doctor to have the packing removed. You may need more treatment if you get nosebleeds a lot.  Follow-up care is a key part of your treatment and safety. Be sure to make and go to all appointments, and call your doctor if you are having problems. It's also a good idea to know your test results and keep a list of the medicines you take.  How can you care for yourself at home?  If you get another nosebleed:  Gently blow your nose to clear any clots.  Sit up and tilt your head slightly forward. This keeps blood from going down your throat.  Use your thumb and index finger to pinch the front, soft part of your nose shut for at least 15 minutes. Use a clock. Do not check to see if the bleeding has stopped before the 15 minutes are up. If the bleeding has not stopped, pinch your nose shut for another 10 to 15 minutes. Using a nasal decongestant spray such as oxymetazoline (Afrin) before pinching your nose can also help to stop the bleeding. Be safe with medicines. Read and follow all instructions on the label.  When the bleeding has stopped, try not to pick, rub, or blow your nose for several hours. Avoiding these things helps keep your nose from bleeding again.  To prevent nosebleeds  Do not blow your nose too hard.  Try not to lift or strain after a nosebleed.  Raise your head on a pillow while you sleep.  Put a thin layer of a saline- or water-based nasal gel, such as NasoGel, inside your nose. Put it on the septum, which divides your nostrils. This will prevent dryness that can cause nosebleeds.  Use a vaporizer or humidifier to add moisture to your bedroom.

## 2025-01-24 DIAGNOSIS — I10 ESSENTIAL HYPERTENSION: ICD-10-CM

## 2025-01-24 RX ORDER — NIFEDIPINE 90 MG/1
TABLET, FILM COATED, EXTENDED RELEASE ORAL
Qty: 90 TABLET | Refills: 3 | Status: SHIPPED | OUTPATIENT
Start: 2025-01-24

## 2025-03-11 ENCOUNTER — TELEPHONE (OUTPATIENT)
Dept: ADMINISTRATIVE | Age: 65
End: 2025-03-11

## 2025-03-11 ENCOUNTER — OFFICE VISIT (OUTPATIENT)
Dept: NEPHROLOGY | Age: 65
End: 2025-03-11
Payer: COMMERCIAL

## 2025-03-11 VITALS
SYSTOLIC BLOOD PRESSURE: 144 MMHG | HEART RATE: 67 BPM | BODY MASS INDEX: 26.29 KG/M2 | HEIGHT: 64 IN | OXYGEN SATURATION: 98 % | DIASTOLIC BLOOD PRESSURE: 85 MMHG | WEIGHT: 154 LBS

## 2025-03-11 DIAGNOSIS — E87.6 HYPOKALEMIA: Primary | ICD-10-CM

## 2025-03-11 DIAGNOSIS — I10 PRIMARY HYPERTENSION: ICD-10-CM

## 2025-03-11 LAB
ANION GAP SERPL CALCULATED.3IONS-SCNC: 7 MMOL/L (ref 4–12)
BUN BLDV-MCNC: 14 MG/DL (ref 7–20)
CALCIUM SERPL-MCNC: 9 MG/DL (ref 8.8–10.5)
CHLORIDE BLD-SCNC: 102 MEQ/L (ref 101–111)
CO2: 30 MEQ/L (ref 21–32)
CREAT SERPL-MCNC: 0.9 MG/DL (ref 0.6–1.3)
CREATININE CLEARANCE: >60
CREATININE, RANDOM URINE: 766.4 MG/DL
GLUCOSE: 94 MG/DL (ref 70–110)
POTASSIUM SERPL-SCNC: 3.6 MEQ/L (ref 3.6–5)
PROTEIN, URINE, RANDOM: 133.4 MG/DL
PROTEIN/CREAT RATIO: 0.17 G/1.73M2
SODIUM BLD-SCNC: 139 MEQ/L (ref 135–145)

## 2025-03-11 PROCEDURE — 3077F SYST BP >= 140 MM HG: CPT | Performed by: INTERNAL MEDICINE

## 2025-03-11 PROCEDURE — 99213 OFFICE O/P EST LOW 20 MIN: CPT | Performed by: INTERNAL MEDICINE

## 2025-03-11 PROCEDURE — 3079F DIAST BP 80-89 MM HG: CPT | Performed by: INTERNAL MEDICINE

## 2025-03-11 PROCEDURE — 1123F ACP DISCUSS/DSCN MKR DOCD: CPT | Performed by: INTERNAL MEDICINE

## 2025-03-11 RX ORDER — POTASSIUM CHLORIDE 1500 MG/1
20 TABLET, EXTENDED RELEASE ORAL 3 TIMES DAILY
COMMUNITY
Start: 2025-03-11

## 2025-03-11 NOTE — TELEPHONE ENCOUNTER
Pt is calling and states that her insurance comp told her that the system was down this am and that is why the ins. Card did not go through.  They gave her [phone] if the office has any questions

## 2025-03-11 NOTE — PROGRESS NOTES
[facility]    [address] [place] [address], [address]   [place] [address]  [phone]  Progress Note  3/11/2025 9:07 AM        Pt Name:    Milli Back  YOB: 1960  Primary Care Physician:  Bear Johnson MD     Chief Complaint:   Chief Complaint   Patient presents with    Follow-up     Hypokalemia, HTN        Background Information/Interval History:   The patient is a 64 year old AA female who was following Dr. Foss previously. She has hx hypertension for 15+ years, hx CKD II,  vitamin D deficiency and hypothyroidism.     Pt is here for follow-up. No urinary complaints. She is here for 1 year follow-up. BP is stable. She is taking K-dur 20 meq TID     Past History:  Past Medical History:   Diagnosis Date    Chronic kidney disease     Hypertension 2001    Hyperthyroidism 1989    Vitamin D deficiency      Past Surgical History:   Procedure Laterality Date    ARTHRODESIS Left 12/22/2023    Total Talus and Navicular Replacement With Titanium Implant- Left Foot, Arthrodesis of 1st, 2nd, and 3rd Cuniform to Navicular Implant with Staples Fixation- Left Foot, Arthrodesis Subtalar Joint with Talus Component Implant- Left Foot, performed by Faisal Menchaca DPM at [facility] OR    DILATION AND CURETTAGE OF UTERUS N/A 06/18/2020    DILATATION AND CURETTAGE HYSTEROSCOPY performed by Guadalupe Anaya MD at [facility] OR    TUBAL LIGATION  1998        VITALS:  BP (!) 144/85 (BP Site: Left Upper Arm, Patient Position: Sitting, BP Cuff Size: Medium Adult)   Pulse 67   Ht 1.626 m (5' 4\")   Wt 69.9 kg (154 lb)   SpO2 98%   BMI 26.43 kg/m²   Wt Readings from Last 3 Encounters:   03/11/25 69.9 kg (154 lb)   12/17/24 71 kg (156 lb 8 oz)   08/06/24 71.4 kg (157 lb 6 oz)     Body mass index is 26.43 kg/m².     General Appearance: alert and cooperative with exam, appears comfortable, no distress  Oral: moist oral mucus membranes  Neck: No jugular venous distention  Lungs: Air entry B/L, no

## 2025-05-27 RX ORDER — ATENOLOL 50 MG/1
50 TABLET ORAL DAILY
Qty: 90 TABLET | Refills: 3 | Status: SHIPPED | OUTPATIENT
Start: 2025-05-27

## 2025-06-16 RX ORDER — POTASSIUM CHLORIDE 1500 MG/1
20 TABLET, EXTENDED RELEASE ORAL 3 TIMES DAILY
Qty: 270 TABLET | Refills: 3 | Status: SHIPPED | OUTPATIENT
Start: 2025-06-16

## 2025-06-16 NOTE — TELEPHONE ENCOUNTER
Date of last visit:  12/17/2024  Date of next visit:  6/18/2025    Requested Prescriptions     Pending Prescriptions Disp Refills    potassium chloride (KLOR-CON M) 20 MEQ extended release tablet       Sig: Take 1 tablet by mouth 3 times daily

## 2025-06-16 NOTE — TELEPHONE ENCOUNTER
Pt called req ref potassium chloride (KLOR-CON M) 20 MEQ extended release tablet Take 1 tablet by mouth 4 times daily 360 tabs refs 3    [facility] [address]

## 2025-06-18 ENCOUNTER — OFFICE VISIT (OUTPATIENT)
Dept: FAMILY MEDICINE CLINIC | Age: 65
End: 2025-06-18

## 2025-06-18 VITALS
HEIGHT: 64 IN | RESPIRATION RATE: 16 BRPM | DIASTOLIC BLOOD PRESSURE: 70 MMHG | SYSTOLIC BLOOD PRESSURE: 136 MMHG | BODY MASS INDEX: 26.18 KG/M2 | HEART RATE: 78 BPM | WEIGHT: 153.38 LBS

## 2025-06-18 DIAGNOSIS — H83.2X9 VESTIBULAR DISEQUILIBRIUM, UNSPECIFIED LATERALITY: Primary | ICD-10-CM

## 2025-06-18 PROCEDURE — 1123F ACP DISCUSS/DSCN MKR DOCD: CPT | Performed by: FAMILY MEDICINE

## 2025-06-18 PROCEDURE — 99213 OFFICE O/P EST LOW 20 MIN: CPT | Performed by: FAMILY MEDICINE

## 2025-06-18 RX ORDER — MECLIZINE HCL 12.5 MG 12.5 MG/1
12.5 TABLET ORAL 3 TIMES DAILY PRN
Qty: 15 TABLET | Refills: 0 | Status: SHIPPED | OUTPATIENT
Start: 2025-06-18

## 2025-06-18 SDOH — ECONOMIC STABILITY: FOOD INSECURITY: WITHIN THE PAST 12 MONTHS, THE FOOD YOU BOUGHT JUST DIDN'T LAST AND YOU DIDN'T HAVE MONEY TO GET MORE.: NEVER TRUE

## 2025-06-18 SDOH — ECONOMIC STABILITY: FOOD INSECURITY: WITHIN THE PAST 12 MONTHS, YOU WORRIED THAT YOUR FOOD WOULD RUN OUT BEFORE YOU GOT MONEY TO BUY MORE.: NEVER TRUE

## 2025-06-18 ASSESSMENT — ENCOUNTER SYMPTOMS
SINUS PRESSURE: 0
CONSTIPATION: 0
SHORTNESS OF BREATH: 0

## 2025-06-18 ASSESSMENT — PATIENT HEALTH QUESTIONNAIRE - PHQ9
1. LITTLE INTEREST OR PLEASURE IN DOING THINGS: NOT AT ALL
SUM OF ALL RESPONSES TO PHQ QUESTIONS 1-9: 0
2. FEELING DOWN, DEPRESSED OR HOPELESS: NOT AT ALL
SUM OF ALL RESPONSES TO PHQ QUESTIONS 1-9: 0

## 2025-06-18 NOTE — PATIENT INSTRUCTIONS
Take the medicine three times a day with one of those daily doses being just before bed.  Let me know next week how it's working.

## 2025-06-18 NOTE — PROGRESS NOTES
Milli Back (:  1960) is a 64 y.o. female,Established patient, here for evaluation of the following chief complaint(s):  Dizziness (Pt states it happens just when she gets up and sometimes at work. Symptoms started 1 week ago per patient /)         Assessment & Plan  Vestibular disequilibrium, unspecified laterality       Orders:  •  meclizine (ANTIVERT) 12.5 MG tablet; Take 1 tablet by mouth 3 times daily as needed for Dizziness      Take the medicine three times a day with one of those daily doses being just before bed.  Let me know next week how it's working.       Subjective   HPI  Last week she noticed feeling dizzy getting out of bed. She may not notice it getting up from a chair.  Turning over in bed may do this  She denies any chest palpitation  Review of Systems   Constitutional:  Negative for fatigue.   HENT:  Negative for sinus pressure.    Eyes:  Negative for visual disturbance.   Respiratory:  Negative for shortness of breath.    Cardiovascular:  Negative for chest pain.   Gastrointestinal:  Negative for constipation.   Genitourinary: Negative.    Musculoskeletal:  Negative for arthralgias.   Skin:  Negative for rash.   Neurological:  Positive for dizziness and light-headedness. Negative for headaches.        The patient's medications, allergies, past medical problems, surgical, social, and family histories were reviewed and updated as needed.    Objective   Physical Exam  Constitutional:       Appearance: Normal appearance. She is well-developed.   HENT:      Head: Normocephalic and atraumatic.      Right Ear: Tympanic membrane normal.      Left Ear: Tympanic membrane normal.      Mouth/Throat:      Pharynx: No oropharyngeal exudate or posterior oropharyngeal erythema.   Eyes:      General: No scleral icterus.     Conjunctiva/sclera: Conjunctivae normal.   Neck:      Trachea: No tracheal deviation.   Cardiovascular:      Rate and Rhythm: Normal rate and regular rhythm.      Heart

## 2025-07-29 ENCOUNTER — OFFICE VISIT (OUTPATIENT)
Dept: FAMILY MEDICINE CLINIC | Age: 65
End: 2025-07-29

## 2025-07-29 VITALS
BODY MASS INDEX: 25.99 KG/M2 | HEIGHT: 64 IN | RESPIRATION RATE: 16 BRPM | WEIGHT: 152.25 LBS | HEART RATE: 60 BPM | SYSTOLIC BLOOD PRESSURE: 122 MMHG | DIASTOLIC BLOOD PRESSURE: 70 MMHG

## 2025-07-29 DIAGNOSIS — H83.2X9 VESTIBULAR DISEQUILIBRIUM, UNSPECIFIED LATERALITY: ICD-10-CM

## 2025-07-29 PROCEDURE — 1123F ACP DISCUSS/DSCN MKR DOCD: CPT | Performed by: FAMILY MEDICINE

## 2025-07-29 PROCEDURE — 99213 OFFICE O/P EST LOW 20 MIN: CPT | Performed by: FAMILY MEDICINE

## 2025-07-29 RX ORDER — MECLIZINE HCL 12.5 MG 12.5 MG/1
12.5 TABLET ORAL 3 TIMES DAILY PRN
Qty: 15 TABLET | Refills: 0 | Status: SHIPPED | OUTPATIENT
Start: 2025-07-29

## 2025-07-29 ASSESSMENT — ENCOUNTER SYMPTOMS
SHORTNESS OF BREATH: 0
SINUS PRESSURE: 0
CONSTIPATION: 0

## 2025-07-29 NOTE — PROGRESS NOTES
Milli Back (:  1960) is a 64 y.o. female,Established patient, here for evaluation of the following chief complaint(s):  Epistaxis and Dizziness         Assessment & Plan  Vestibular disequilibrium, unspecified laterality       Orders:    meclizine (ANTIVERT) 12.5 MG tablet; Take 1 tablet by mouth 3 times daily as needed for Dizziness    External Referral To Physical Therapy      If the bleeding resumes go to the ER immediately.  See me next month still       Subjective   HPI  She has had some dizziness in the past and the meclizine helps.  There was bleeding from both nares today.   No ASA use  No blood seen in the bowel or urine or unexplained bruising  She mentions feeling dizzy with turning over in bed or leaning down and standing straight.  We reviewed likely vestibular trouble.  Review of Systems   Constitutional:  Negative for fatigue.   HENT:  Positive for nosebleeds. Negative for sinus pressure.    Eyes:  Negative for visual disturbance.   Respiratory:  Negative for shortness of breath.    Cardiovascular:  Negative for chest pain.   Gastrointestinal:  Negative for constipation.   Genitourinary: Negative.    Musculoskeletal:  Negative for arthralgias.   Skin:  Negative for rash.   Neurological:  Positive for dizziness and light-headedness. Negative for headaches.        The patient's medications, allergies, past medical problems, surgical, social, and family histories were reviewed and updated as needed.    Objective   Physical Exam  Constitutional:       Appearance: Normal appearance. She is well-developed.   HENT:      Head: Normocephalic and atraumatic.      Nose:      Comments: There was a sign of recent bleeding to each side of the septum, fresher on the left. Silver nitrate was used with one application  but that created a larger spot of bleeding which did not worsen with several subsequent inspections. The right side was left alone.  Eyes:      General: No scleral icterus.

## 2025-07-29 NOTE — PROGRESS NOTES
Faxed referral, pt demo, last office notes, and insurance cards to [facility] ([phone]). They will contact pt to schedule appt.

## 2025-08-19 ENCOUNTER — TELEPHONE (OUTPATIENT)
Dept: FAMILY MEDICINE CLINIC | Age: 65
End: 2025-08-19

## 2025-08-19 ENCOUNTER — OFFICE VISIT (OUTPATIENT)
Dept: FAMILY MEDICINE CLINIC | Age: 65
End: 2025-08-19

## 2025-08-19 VITALS
DIASTOLIC BLOOD PRESSURE: 62 MMHG | WEIGHT: 154.13 LBS | HEIGHT: 64 IN | SYSTOLIC BLOOD PRESSURE: 110 MMHG | HEART RATE: 76 BPM | BODY MASS INDEX: 26.31 KG/M2 | RESPIRATION RATE: 16 BRPM

## 2025-08-19 DIAGNOSIS — Z78.0 POST-MENOPAUSAL: Primary | ICD-10-CM

## 2025-08-19 DIAGNOSIS — H83.2X9 VESTIBULAR DISEQUILIBRIUM, UNSPECIFIED LATERALITY: ICD-10-CM

## 2025-08-19 DIAGNOSIS — Z12.31 VISIT FOR SCREENING MAMMOGRAM: ICD-10-CM

## 2025-08-19 DIAGNOSIS — E03.9 ACQUIRED HYPOTHYROIDISM: ICD-10-CM

## 2025-08-19 DIAGNOSIS — I10 ESSENTIAL HYPERTENSION: ICD-10-CM

## 2025-08-19 PROCEDURE — 99397 PER PM REEVAL EST PAT 65+ YR: CPT | Performed by: FAMILY MEDICINE

## 2025-08-19 RX ORDER — MECLIZINE HCL 12.5 MG 12.5 MG/1
12.5 TABLET ORAL 3 TIMES DAILY PRN
Qty: 40 TABLET | Refills: 0 | Status: SHIPPED | OUTPATIENT
Start: 2025-08-19

## 2025-08-19 ASSESSMENT — ENCOUNTER SYMPTOMS
SINUS PRESSURE: 0
SHORTNESS OF BREATH: 0
CONSTIPATION: 0

## (undated) DEVICE — BANDAGE COMPR E SGL LAYERED CLSR BGE W/ CLP W4INXL15FT

## (undated) DEVICE — PAD,NON-ADHERENT,3X8,STERILE,LF,1/PK: Brand: MEDLINE

## (undated) DEVICE — GOWN,SIRUS,NON REINFRCD,LARGE,SET IN SL: Brand: MEDLINE

## (undated) DEVICE — GLOVE ORANGE PI 8   MSG9080

## (undated) DEVICE — PENCIL SMK EVAC ALL IN 1 DSGN ENH VISIBILITY IMPROVED AIR

## (undated) DEVICE — BAG,BANDED,W/RUBBERBAND,STERILE,30X36: Brand: MEDLINE

## (undated) DEVICE — BASIC SINGLE BASIN BTC-LF: Brand: MEDLINE INDUSTRIES, INC.

## (undated) DEVICE — Device

## (undated) DEVICE — Y-TYPE TUR/BLADDER IRRIGATION SET, REGULATING CLAMP

## (undated) DEVICE — HYPODERMIC SAFETY NEEDLE: Brand: MAGELLAN

## (undated) DEVICE — GLOVE SURG SZ 6 THK91MIL LTX FREE SYN POLYISOPRENE ANTI

## (undated) DEVICE — STRIP SKIN CLSR W0.25XL4IN WHT SPUNBOUND FBR NYL HI ADH

## (undated) DEVICE — BLADE,CARBON-STEEL,15,STRL,DISPOSABLE,TB: Brand: MEDLINE

## (undated) DEVICE — THIN OFFSET (13.3 X 0.38 X 42.0MM)

## (undated) DEVICE — Z DISCONTINUED BY MEDLINE USE 2711682 TRAY SKIN PREP DRY W/ PREM GLV

## (undated) DEVICE — DRAPE,EXTREMITY,89X128,STERILE: Brand: MEDLINE

## (undated) DEVICE — SOLUTION IV 1000ML 0.9% SOD CHL PH 5 INJ USP VIAFLX PLAS

## (undated) DEVICE — TUBING, SUCTION, 1/4" X 12', STRAIGHT: Brand: MEDLINE

## (undated) DEVICE — THE MILL DISPOSABLE - MEDIUM

## (undated) DEVICE — SYRINGE MED 10ML LUERLOCK TIP W/O SFTY DISP

## (undated) DEVICE — PADDING CAST W4XL144IN WHT COT SYN RL NONADHESIVE SOF-ROL

## (undated) DEVICE — BANDAGE COMPR W4INXL12FT E DISP ESMARCH EVEN

## (undated) DEVICE — BANDAGE,GAUZE,CONFORMING,4"X75",STRL,LF: Brand: MEDLINE

## (undated) DEVICE — SOLUTION SCRB 4OZ 4% CHG H2O AIDED FOR PREOPERATIVE SKIN

## (undated) DEVICE — GOWN,SIRUS,NONRNF,SETINSLV,XL,20/CS: Brand: MEDLINE

## (undated) DEVICE — SUTURE VCRL SZ 2-0 L27IN ABSRB UD L36MM CP-1 1/2 CIR REV J266H

## (undated) DEVICE — SUTURE MCRYL SZ 4-0 L27IN ABSRB UD L19MM PS-2 1/2 CIR PRIM Y426H

## (undated) DEVICE — DRAPE,U/SHT,SPLIT,FILM,60X84,STERILE: Brand: MEDLINE

## (undated) DEVICE — SUTURE PROL SZ 4-0 L18IN NONABSORBABLE BLU L19MM PS-2 3/8 8682G

## (undated) DEVICE — PAD,SANITARY,11 IN,MAXI,W/WINGS,N-STRL: Brand: MEDLINE

## (undated) DEVICE — CATHETER,URETHRAL,REDRUBBER,STRL,14FR: Brand: MEDLINE INDUSTRIES, INC.

## (undated) DEVICE — SPONGE GZ W4XL4IN COT 12 PLY TYP VII WVN C FLD DSGN STERILE

## (undated) DEVICE — BANDAGE,GAUZE,4.5"X4.1YD,STERILE,LF: Brand: MEDLINE

## (undated) DEVICE — SET UP: Brand: MEDLINE INDUSTRIES, INC.

## (undated) DEVICE — GAUZE,SPONGE,8"X4",12PLY,XRAY,STRL,LF: Brand: MEDLINE

## (undated) DEVICE — SURE SET SINGLE BASIN-LF: Brand: MEDLINE INDUSTRIES, INC.

## (undated) DEVICE — PRECISION (9.0 X 0.51 X 31.0MM)

## (undated) DEVICE — SUTURE VCRL SZ 3-0 L27IN ABSRB UD FS-2 L19MM 1/2 CIR J423H

## (undated) DEVICE — IMPREGNATED GAUZE DRESSING: Brand: CUTICERIN 7.5X20CM CTN 50

## (undated) DEVICE — DRAPE,UNDERBUTTOCKS,PCH,STERILE: Brand: MEDLINE

## (undated) DEVICE — 4.0MM EGG RESURFACING TOOL